# Patient Record
Sex: FEMALE | Race: WHITE | NOT HISPANIC OR LATINO | Employment: OTHER | URBAN - METROPOLITAN AREA
[De-identification: names, ages, dates, MRNs, and addresses within clinical notes are randomized per-mention and may not be internally consistent; named-entity substitution may affect disease eponyms.]

---

## 2017-06-29 LAB — B-HEM STREP SPEC QL CULT: NEGATIVE

## 2017-07-04 LAB
BACTERIA SPEC AEROBE CULT: NORMAL
BACTERIA SPEC CULT: NORMAL
SPECIMEN STATUS REPORT: NORMAL

## 2017-11-29 ENCOUNTER — OFFICE VISIT (OUTPATIENT)
Dept: URGENT CARE | Facility: CLINIC | Age: 82
End: 2017-11-29
Payer: MEDICARE

## 2017-11-29 VITALS
OXYGEN SATURATION: 97 % | SYSTOLIC BLOOD PRESSURE: 149 MMHG | RESPIRATION RATE: 12 BRPM | HEIGHT: 68 IN | HEART RATE: 55 BPM | BODY MASS INDEX: 23.49 KG/M2 | DIASTOLIC BLOOD PRESSURE: 61 MMHG | TEMPERATURE: 97 F | WEIGHT: 155 LBS

## 2017-11-29 DIAGNOSIS — J01.90 ACUTE SINUSITIS, RECURRENCE NOT SPECIFIED, UNSPECIFIED LOCATION: Primary | ICD-10-CM

## 2017-11-29 PROCEDURE — 99214 OFFICE O/P EST MOD 30 MIN: CPT | Mod: S$GLB,,, | Performed by: EMERGENCY MEDICINE

## 2017-11-29 RX ORDER — BENZONATATE 200 MG/1
200 CAPSULE ORAL 3 TIMES DAILY PRN
Qty: 30 CAPSULE | Refills: 0 | Status: SHIPPED | OUTPATIENT
Start: 2017-11-29 | End: 2017-12-09

## 2017-11-29 RX ORDER — DOXYCYCLINE HYCLATE 100 MG
100 TABLET ORAL 2 TIMES DAILY
Qty: 20 TABLET | Refills: 0 | Status: SHIPPED | OUTPATIENT
Start: 2017-11-29 | End: 2017-12-09

## 2017-11-29 RX ORDER — CODEINE PHOSPHATE AND GUAIFENESIN 10; 100 MG/5ML; MG/5ML
10 SOLUTION ORAL EVERY 6 HOURS PRN
Qty: 120 ML | Refills: 0 | Status: SHIPPED | OUTPATIENT
Start: 2017-11-29 | End: 2017-12-09

## 2017-11-29 NOTE — PROGRESS NOTES
"Subjective:       Patient ID: Lakshmi Juarez is a 82 y.o. female.    Vitals:  height is 5' 7.5" (1.715 m) and weight is 70.3 kg (155 lb). Her temperature is 97.4 °F (36.3 °C). Her blood pressure is 149/61 (abnormal) and her pulse is 55 (abnormal). Her respiration is 12 and oxygen saturation is 97%.     Chief Complaint: Sinus Problem    Pt with sinus issue for about 3 weeks. She says the coughing is worse at night. No fever. She does have allergies but this is much worse. Hoarse cough all day long.She can't take decongestants She does take flonase      Sinus Problem   This is a new problem. The current episode started 1 to 4 weeks ago. The problem is unchanged. There has been no fever. Her pain is at a severity of 0/10. She is experiencing no pain. Associated symptoms include congestion and coughing. Pertinent negatives include no chills, ear pain, headaches, hoarse voice, shortness of breath or sore throat. Past treatments include oral decongestants. The treatment provided mild relief.     Review of Systems   Constitution: Negative for chills, fever and malaise/fatigue.   HENT: Positive for congestion. Negative for ear pain, hoarse voice and sore throat.    Eyes: Negative for discharge and redness.   Cardiovascular: Negative for chest pain, dyspnea on exertion and leg swelling.   Respiratory: Positive for cough. Negative for shortness of breath, sputum production and wheezing.    Musculoskeletal: Negative for myalgias.   Gastrointestinal: Negative for abdominal pain and nausea.   Neurological: Negative for headaches.       Objective:      Physical Exam   Constitutional: She is oriented to person, place, and time. She appears well-developed and well-nourished. She is cooperative.  Non-toxic appearance. She does not appear ill. No distress.   Coarse sounding cough   HENT:   Head: Normocephalic and atraumatic.   Right Ear: Hearing, tympanic membrane, external ear and ear canal normal.   Left Ear: Hearing, tympanic " membrane, external ear and ear canal normal.   Nose: Mucosal edema and rhinorrhea present. No nasal deformity. No epistaxis. Right sinus exhibits no maxillary sinus tenderness and no frontal sinus tenderness. Left sinus exhibits no maxillary sinus tenderness and no frontal sinus tenderness.   Mouth/Throat: Uvula is midline and mucous membranes are normal. No trismus in the jaw. Normal dentition. No uvula swelling. Posterior oropharyngeal erythema present.   Eyes: Conjunctivae and lids are normal. No scleral icterus.   Sclera clear bilat   Neck: Trachea normal, full passive range of motion without pain and phonation normal. Neck supple.   Cardiovascular: Normal rate, regular rhythm, normal heart sounds, intact distal pulses and normal pulses.    Pulmonary/Chest: Effort normal and breath sounds normal. No respiratory distress.   Abdominal: Soft. Normal appearance and bowel sounds are normal. She exhibits no distension. There is no tenderness.   Musculoskeletal: Normal range of motion. She exhibits no edema or deformity.   Neurological: She is alert and oriented to person, place, and time. She exhibits normal muscle tone. Coordination normal.   Skin: Skin is warm, dry and intact. She is not diaphoretic. No pallor.   Psychiatric: She has a normal mood and affect. Her speech is normal and behavior is normal. Judgment and thought content normal. Cognition and memory are normal.   Nursing note and vitals reviewed.      Assessment:       1. Acute sinusitis, recurrence not specified, unspecified location        Plan:         Acute sinusitis, recurrence not specified, unspecified location    Other orders  -     benzonatate (TESSALON) 200 MG capsule; Take 1 capsule (200 mg total) by mouth 3 (three) times daily as needed for Cough.  Dispense: 30 capsule; Refill: 0  -     guaifenesin-codeine 100-10 mg/5 ml (TUSSI-ORGANIDIN NR)  mg/5 mL syrup; Take 10 mLs by mouth every 6 (six) hours as needed for Cough.  Dispense: 120 mL;  Refill: 0  -     doxycycline (VIBRA-TABS) 100 MG tablet; Take 1 tablet (100 mg total) by mouth 2 (two) times daily.  Dispense: 20 tablet; Refill: 0

## 2018-02-25 ENCOUNTER — OFFICE VISIT (OUTPATIENT)
Dept: URGENT CARE | Facility: CLINIC | Age: 83
End: 2018-02-25
Payer: MEDICARE

## 2018-02-25 VITALS
HEIGHT: 67 IN | HEART RATE: 61 BPM | DIASTOLIC BLOOD PRESSURE: 69 MMHG | OXYGEN SATURATION: 98 % | SYSTOLIC BLOOD PRESSURE: 130 MMHG | WEIGHT: 155 LBS | RESPIRATION RATE: 19 BRPM | TEMPERATURE: 97 F | BODY MASS INDEX: 24.33 KG/M2

## 2018-02-25 DIAGNOSIS — R05.9 COUGH: Primary | ICD-10-CM

## 2018-02-25 DIAGNOSIS — J06.9 UPPER RESPIRATORY TRACT INFECTION, UNSPECIFIED TYPE: ICD-10-CM

## 2018-02-25 PROCEDURE — 1159F MED LIST DOCD IN RCRD: CPT | Mod: S$GLB,,, | Performed by: EMERGENCY MEDICINE

## 2018-02-25 PROCEDURE — 99214 OFFICE O/P EST MOD 30 MIN: CPT | Mod: S$GLB,,, | Performed by: EMERGENCY MEDICINE

## 2018-02-25 RX ORDER — CODEINE PHOSPHATE AND GUAIFENESIN 10; 100 MG/5ML; MG/5ML
10 SOLUTION ORAL EVERY 6 HOURS PRN
Qty: 120 ML | Refills: 0 | Status: SHIPPED | OUTPATIENT
Start: 2018-02-25 | End: 2018-03-07

## 2018-02-25 RX ORDER — DOXYCYCLINE HYCLATE 100 MG
100 TABLET ORAL 2 TIMES DAILY
Qty: 20 TABLET | Refills: 0 | Status: SHIPPED | OUTPATIENT
Start: 2018-02-25 | End: 2018-03-07

## 2018-02-25 RX ORDER — BENZONATATE 200 MG/1
200 CAPSULE ORAL 3 TIMES DAILY PRN
Qty: 30 CAPSULE | Refills: 0 | Status: SHIPPED | OUTPATIENT
Start: 2018-02-25 | End: 2018-03-07

## 2018-02-25 RX ORDER — PREDNISONE 1 MG/1
1 TABLET ORAL DAILY
Status: ON HOLD | COMMUNITY
End: 2019-01-09 | Stop reason: CLARIF

## 2018-02-25 NOTE — PATIENT INSTRUCTIONS
"Please be aware as we discussed that narcotics can be addictive. I have given you a limited quantity to take as it is needed at this time. However take it sparingly and only when needed.                                                         URI   If your condition worsens or fails to improve we recommend that you receive another evaluation at the ER immediately or contact your PCP to discuss your concerns or return here. You must understand that you've received an urgent care treatment only and that you may be released before all your medical problems are known or treated. [facility] the [facility].   If we discussed that I think your illness is viral, it will not respond to antibiotics and will last 10-14 days. If we discussed "wait and see" antibiotics and if over the next few days the symptoms worsen start the antibiotics I have given you.   If you are female and on BCP and do take the antibiotics, use additional methods to prevent pregnancy while on the antibiotics and for one cycle after.   Flonase (fluticasone) is a nasal spray which is available over the counter and may help with your symptoms.   Zyrtec D, Claritin D or Allegra D can also help with symptoms of congestion and drainage.   If you have hypertension avoid using the "D" which is the decongestant   If you just have drainage you can take plain zyrtec, claritin or allegra   If you just have a congested feeling you can take pseudoephedrine (unless you have high blood pressure) which you have to sign for behind the counter. Do not buy the phenylephrine which is on the shelf as it is not effective   Rest and fluids are also important.   Tylenol or ibuprofen can also be used as directed for pain unless you have an allergy to them or medical condition such as stomach ulcers, kidney or liver disease or blood thinners etc for which you should not be taking these type of medications.   If you are flying in the next few " days Afrin nose drops for the airplane flight upon take off and landing may help. Other than at those times refrain from using afrin.   If you were prescribed a narcotic do not drive or operate heavy machinery while taking these medications.

## 2018-02-25 NOTE — PROGRESS NOTES
"Subjective:       Patient ID: Lakshmi Juarez is a 82 y.o. female.    Vitals:  height is 5' 7" (1.702 m) and weight is 70.3 kg (155 lb). Her oral temperature is 97.4 °F (36.3 °C). Her blood pressure is 130/69 and her pulse is 61. Her respiration is 19 and oxygen saturation is 98%.     Chief Complaint: Cough    Patient with cough x4 days . Patient saw Dr. Barros on February 19, 2018 as followup for epistaxis. Patient was placed on prednisone for chronic allergies and allergic cough and the following day the cough changed to a deep hoarse cough  and has had gotten worse in the last few days. Patient with nose bleed 3 weeks go which required her to be hospitalized. Patient stating she has had a cough which is a clearing of her throat for many years and no doctor has been able to get rid of the symptom.   She was off the eliquis for a few days when she had the nose bleed but is back on it. She did get the flu shot. She has not had fever. She hasn't slept in several days due to the coughing. No chest pain No leg pain    CXR neg discussed wait and see meds. She will call Dr. Barros tomorrow because she thinks the steroids may have caused this. I doubt it but we discussed it. She is finished it anyway      Cough   This is a chronic problem. The problem has been unchanged. The problem occurs every few minutes. Associated symptoms include headaches. Pertinent negatives include no chest pain, chills, ear pain, eye redness, fever, myalgias, sore throat, shortness of breath or wheezing.     Review of Systems   Constitution: Positive for malaise/fatigue. Negative for chills and fever.   HENT: Negative for congestion, ear pain, hoarse voice and sore throat.    Eyes: Negative for discharge and redness.   Cardiovascular: Negative for chest pain, dyspnea on exertion and leg swelling.   Respiratory: Positive for cough and sputum production. Negative for shortness of breath and wheezing.    Musculoskeletal: Negative for myalgias. "   Gastrointestinal: Negative for abdominal pain and nausea.   Neurological: Positive for headaches.       Objective:      Physical Exam   Constitutional: She is oriented to person, place, and time. She appears well-developed and well-nourished. She is cooperative.  Non-toxic appearance. She does not appear ill. No distress.   Deep hoarse sounding cough   HENT:   Head: Normocephalic and atraumatic.   Right Ear: Hearing, tympanic membrane, external ear and ear canal normal.   Left Ear: Hearing, tympanic membrane, external ear and ear canal normal.   Nose: Nose normal. No mucosal edema, rhinorrhea or nasal deformity. No epistaxis. Right sinus exhibits no maxillary sinus tenderness and no frontal sinus tenderness. Left sinus exhibits no maxillary sinus tenderness and no frontal sinus tenderness.   Mouth/Throat: Uvula is midline, oropharynx is clear and moist and mucous membranes are normal. No trismus in the jaw. Normal dentition. No uvula swelling. No posterior oropharyngeal erythema.   Eyes: Conjunctivae, EOM and lids are normal. Pupils are equal, round, and reactive to light. No scleral icterus.   Sclera clear bilat   Neck: Trachea normal, normal range of motion, full passive range of motion without pain and phonation normal. Neck supple.   Cardiovascular: Normal rate, regular rhythm, normal heart sounds, intact distal pulses and normal pulses.    Pulmonary/Chest: Effort normal and breath sounds normal. No respiratory distress.   Abdominal: Soft. Normal appearance and bowel sounds are normal. She exhibits no distension. There is no tenderness.   Musculoskeletal: Normal range of motion. She exhibits no edema or deformity.   Neurological: She is alert and oriented to person, place, and time. She exhibits normal muscle tone. Coordination normal.   Skin: Skin is warm, dry and intact. She is not diaphoretic. No pallor.   Psychiatric: She has a normal mood and affect. Her speech is normal and behavior is normal. Judgment  and thought content normal. Cognition and memory are normal.   Nursing note and vitals reviewed.    CXR neg and confirm radiologist  Assessment:       1. Cough    2. Upper respiratory tract infection, unspecified type        Plan:         Cough  -     X-Ray Chest PA And Lateral; Future; Expected date: 02/25/2018    Upper respiratory tract infection, unspecified type    Other orders  -     benzonatate (TESSALON) 200 MG capsule; Take 1 capsule (200 mg total) by mouth 3 (three) times daily as needed for Cough.  Dispense: 30 capsule; Refill: 0  -     guaifenesin-codeine 100-10 mg/5 ml (TUSSI-ORGANIDIN NR)  mg/5 mL syrup; Take 10 mLs by mouth every 6 (six) hours as needed for Cough.  Dispense: 120 mL; Refill: 0  -     doxycycline (VIBRA-TABS) 100 MG tablet; Take 1 tablet (100 mg total) by mouth 2 (two) times daily.  Dispense: 20 tablet; Refill: 0

## 2019-01-03 ENCOUNTER — OFFICE VISIT (OUTPATIENT)
Dept: URGENT CARE | Facility: CLINIC | Age: 84
End: 2019-01-03
Payer: MEDICARE

## 2019-01-03 ENCOUNTER — HOSPITAL ENCOUNTER (INPATIENT)
Facility: OTHER | Age: 84
LOS: 8 days | Discharge: HOME | DRG: 392 | End: 2019-01-11
Attending: EMERGENCY MEDICINE | Admitting: HOSPITALIST
Payer: MEDICARE

## 2019-01-03 VITALS
TEMPERATURE: 97 F | OXYGEN SATURATION: 98 % | HEART RATE: 107 BPM | WEIGHT: 150 LBS | RESPIRATION RATE: 18 BRPM | DIASTOLIC BLOOD PRESSURE: 70 MMHG | SYSTOLIC BLOOD PRESSURE: 116 MMHG | HEIGHT: 67 IN | BODY MASS INDEX: 23.54 KG/M2

## 2019-01-03 DIAGNOSIS — K52.9 COLITIS: ICD-10-CM

## 2019-01-03 DIAGNOSIS — I48.92 ATRIAL FLUTTER: ICD-10-CM

## 2019-01-03 DIAGNOSIS — R10.9 ABDOMINAL PAIN, UNSPECIFIED ABDOMINAL LOCATION: Primary | ICD-10-CM

## 2019-01-03 DIAGNOSIS — K56.609 INTESTINAL OBSTRUCTION, UNSPECIFIED CAUSE, UNSPECIFIED WHETHER PARTIAL OR COMPLETE: ICD-10-CM

## 2019-01-03 DIAGNOSIS — K52.9 ACUTE COLITIS: Primary | ICD-10-CM

## 2019-01-03 LAB
ALBUMIN SERPL BCP-MCNC: 4 G/DL
ALP SERPL-CCNC: 99 U/L
ALT SERPL W/O P-5'-P-CCNC: 20 U/L
AMYLASE SERPL-CCNC: 29 U/L
ANION GAP SERPL CALC-SCNC: 10 MMOL/L
AST SERPL-CCNC: 21 U/L
BACTERIA #/AREA URNS HPF: ABNORMAL /HPF
BASOPHILS # BLD AUTO: 0.03 K/UL
BASOPHILS NFR BLD: 0.2 %
BILIRUB SERPL-MCNC: 2.9 MG/DL
BILIRUB UR QL STRIP: NEGATIVE
BUN SERPL-MCNC: 19 MG/DL
CALCIUM SERPL-MCNC: 9.6 MG/DL
CHLORIDE SERPL-SCNC: 104 MMOL/L
CLARITY UR: CLEAR
CO2 SERPL-SCNC: 24 MMOL/L
COLOR UR: YELLOW
CREAT SERPL-MCNC: 0.9 MG/DL
DIFFERENTIAL METHOD: ABNORMAL
EOSINOPHIL # BLD AUTO: 0 K/UL
EOSINOPHIL NFR BLD: 0.1 %
ERYTHROCYTE [DISTWIDTH] IN BLOOD BY AUTOMATED COUNT: 14.4 %
EST. GFR  (AFRICAN AMERICAN): >60 ML/MIN/1.73 M^2
EST. GFR  (NON AFRICAN AMERICAN): >60 ML/MIN/1.73 M^2
GLUCOSE SERPL-MCNC: 111 MG/DL
GLUCOSE UR QL STRIP: NEGATIVE
HCT VFR BLD AUTO: 42.8 %
HGB BLD-MCNC: 14.2 G/DL
HGB UR QL STRIP: ABNORMAL
INR PPP: 1
KETONES UR QL STRIP: NEGATIVE
LACTATE SERPL-SCNC: 1.8 MMOL/L
LEUKOCYTE ESTERASE UR QL STRIP: NEGATIVE
LIPASE SERPL-CCNC: 8 U/L
LYMPHOCYTES # BLD AUTO: 1.2 K/UL
LYMPHOCYTES NFR BLD: 6.2 %
MCH RBC QN AUTO: 30.4 PG
MCHC RBC AUTO-ENTMCNC: 33.2 G/DL
MCV RBC AUTO: 92 FL
MICROSCOPIC COMMENT: ABNORMAL
MONOCYTES # BLD AUTO: 1.3 K/UL
MONOCYTES NFR BLD: 6.8 %
NEUTROPHILS # BLD AUTO: 16.2 K/UL
NEUTROPHILS NFR BLD: 86.4 %
NITRITE UR QL STRIP: NEGATIVE
PH UR STRIP: 7 [PH] (ref 5–8)
PLATELET # BLD AUTO: 256 K/UL
PMV BLD AUTO: 10.9 FL
POTASSIUM SERPL-SCNC: 3.8 MMOL/L
PROT SERPL-MCNC: 7.1 G/DL
PROT UR QL STRIP: NEGATIVE
PROTHROMBIN TIME: 10.8 SEC
RBC # BLD AUTO: 4.67 M/UL
RBC #/AREA URNS HPF: 1 /HPF (ref 0–4)
SODIUM SERPL-SCNC: 138 MMOL/L
SP GR UR STRIP: <=1.005 (ref 1–1.03)
URN SPEC COLLECT METH UR: ABNORMAL
UROBILINOGEN UR STRIP-ACNC: NEGATIVE EU/DL
WBC # BLD AUTO: 18.69 K/UL
WBC #/AREA URNS HPF: 1 /HPF (ref 0–5)

## 2019-01-03 PROCEDURE — 80053 COMPREHEN METABOLIC PANEL: CPT

## 2019-01-03 PROCEDURE — 85025 COMPLETE CBC W/AUTO DIFF WBC: CPT

## 2019-01-03 PROCEDURE — 99214 PR OFFICE/OUTPT VISIT, EST, LEVL IV, 30-39 MIN: ICD-10-PCS | Mod: S$GLB,,, | Performed by: FAMILY MEDICINE

## 2019-01-03 PROCEDURE — 83690 ASSAY OF LIPASE: CPT

## 2019-01-03 PROCEDURE — 96365 THER/PROPH/DIAG IV INF INIT: CPT

## 2019-01-03 PROCEDURE — 87040 BLOOD CULTURE FOR BACTERIA: CPT

## 2019-01-03 PROCEDURE — 25000003 PHARM REV CODE 250: Performed by: NURSE PRACTITIONER

## 2019-01-03 PROCEDURE — 99223 PR INITIAL HOSPITAL CARE,LEVL III: ICD-10-PCS | Mod: AI,,, | Performed by: NURSE PRACTITIONER

## 2019-01-03 PROCEDURE — 99223 1ST HOSP IP/OBS HIGH 75: CPT | Mod: AI,,, | Performed by: NURSE PRACTITIONER

## 2019-01-03 PROCEDURE — 99214 OFFICE O/P EST MOD 30 MIN: CPT | Mod: S$GLB,,, | Performed by: FAMILY MEDICINE

## 2019-01-03 PROCEDURE — 96375 TX/PRO/DX INJ NEW DRUG ADDON: CPT

## 2019-01-03 PROCEDURE — 74019 RADEX ABDOMEN 2 VIEWS: CPT | Mod: FY,S$GLB,, | Performed by: RADIOLOGY

## 2019-01-03 PROCEDURE — 25000003 PHARM REV CODE 250: Performed by: EMERGENCY MEDICINE

## 2019-01-03 PROCEDURE — 85610 PROTHROMBIN TIME: CPT

## 2019-01-03 PROCEDURE — 63600175 PHARM REV CODE 636 W HCPCS: Performed by: EMERGENCY MEDICINE

## 2019-01-03 PROCEDURE — 99285 EMERGENCY DEPT VISIT HI MDM: CPT | Mod: 25

## 2019-01-03 PROCEDURE — 81000 URINALYSIS NONAUTO W/SCOPE: CPT

## 2019-01-03 PROCEDURE — 11000001 HC ACUTE MED/SURG PRIVATE ROOM

## 2019-01-03 PROCEDURE — 96372 THER/PROPH/DIAG INJ SC/IM: CPT | Mod: 59

## 2019-01-03 PROCEDURE — S0030 INJECTION, METRONIDAZOLE: HCPCS | Performed by: EMERGENCY MEDICINE

## 2019-01-03 PROCEDURE — 74019 XR ABDOMEN FLAT AND ERECT: ICD-10-PCS | Mod: FY,S$GLB,, | Performed by: RADIOLOGY

## 2019-01-03 PROCEDURE — 96361 HYDRATE IV INFUSION ADD-ON: CPT

## 2019-01-03 PROCEDURE — 83605 ASSAY OF LACTIC ACID: CPT

## 2019-01-03 PROCEDURE — 63600175 PHARM REV CODE 636 W HCPCS: Performed by: NURSE PRACTITIONER

## 2019-01-03 PROCEDURE — 25500020 PHARM REV CODE 255: Performed by: EMERGENCY MEDICINE

## 2019-01-03 PROCEDURE — 82150 ASSAY OF AMYLASE: CPT

## 2019-01-03 RX ORDER — OXYBUTYNIN CHLORIDE 5 MG/1
10 TABLET, EXTENDED RELEASE ORAL DAILY
Status: DISCONTINUED | OUTPATIENT
Start: 2019-01-04 | End: 2019-01-11 | Stop reason: HOSPADM

## 2019-01-03 RX ORDER — LOSARTAN POTASSIUM 50 MG/1
100 TABLET ORAL DAILY
Status: DISCONTINUED | OUTPATIENT
Start: 2019-01-04 | End: 2019-01-11 | Stop reason: HOSPADM

## 2019-01-03 RX ORDER — FAMOTIDINE 20 MG/1
20 TABLET, FILM COATED ORAL 2 TIMES DAILY
Status: DISCONTINUED | OUTPATIENT
Start: 2019-01-03 | End: 2019-01-11 | Stop reason: HOSPADM

## 2019-01-03 RX ORDER — CIPROFLOXACIN 2 MG/ML
400 INJECTION, SOLUTION INTRAVENOUS
Status: COMPLETED | OUTPATIENT
Start: 2019-01-03 | End: 2019-01-03

## 2019-01-03 RX ORDER — MORPHINE SULFATE 4 MG/ML
4 INJECTION, SOLUTION INTRAMUSCULAR; INTRAVENOUS EVERY 4 HOURS PRN
Status: DISCONTINUED | OUTPATIENT
Start: 2019-01-03 | End: 2019-01-05

## 2019-01-03 RX ORDER — CIPROFLOXACIN 2 MG/ML
400 INJECTION, SOLUTION INTRAVENOUS
Status: DISCONTINUED | OUTPATIENT
Start: 2019-01-04 | End: 2019-01-10

## 2019-01-03 RX ORDER — ACETAMINOPHEN 325 MG/1
650 TABLET ORAL EVERY 4 HOURS PRN
Status: DISCONTINUED | OUTPATIENT
Start: 2019-01-03 | End: 2019-01-11 | Stop reason: HOSPADM

## 2019-01-03 RX ORDER — METRONIDAZOLE 500 MG/100ML
500 INJECTION, SOLUTION INTRAVENOUS
Status: DISCONTINUED | OUTPATIENT
Start: 2019-01-04 | End: 2019-01-10

## 2019-01-03 RX ORDER — ONDANSETRON 2 MG/ML
4 INJECTION INTRAMUSCULAR; INTRAVENOUS
Status: COMPLETED | OUTPATIENT
Start: 2019-01-03 | End: 2019-01-03

## 2019-01-03 RX ORDER — ONDANSETRON 8 MG/1
8 TABLET, ORALLY DISINTEGRATING ORAL EVERY 8 HOURS PRN
Status: DISCONTINUED | OUTPATIENT
Start: 2019-01-03 | End: 2019-01-11 | Stop reason: HOSPADM

## 2019-01-03 RX ORDER — PANTOPRAZOLE SODIUM 40 MG/1
40 TABLET, DELAYED RELEASE ORAL DAILY
Status: DISCONTINUED | OUTPATIENT
Start: 2019-01-04 | End: 2019-01-11 | Stop reason: HOSPADM

## 2019-01-03 RX ORDER — SODIUM CHLORIDE 0.9 % (FLUSH) 0.9 %
5 SYRINGE (ML) INJECTION
Status: DISCONTINUED | OUTPATIENT
Start: 2019-01-03 | End: 2019-01-03

## 2019-01-03 RX ORDER — SODIUM CHLORIDE 0.9 % (FLUSH) 0.9 %
5 SYRINGE (ML) INJECTION
Status: DISCONTINUED | OUTPATIENT
Start: 2019-01-03 | End: 2019-01-11 | Stop reason: HOSPADM

## 2019-01-03 RX ORDER — SODIUM CHLORIDE 9 MG/ML
INJECTION, SOLUTION INTRAVENOUS CONTINUOUS
Status: DISCONTINUED | OUTPATIENT
Start: 2019-01-03 | End: 2019-01-05

## 2019-01-03 RX ORDER — METRONIDAZOLE 500 MG/100ML
500 INJECTION, SOLUTION INTRAVENOUS
Status: COMPLETED | OUTPATIENT
Start: 2019-01-03 | End: 2019-01-03

## 2019-01-03 RX ORDER — NIFEDIPINE 30 MG/1
30 TABLET, EXTENDED RELEASE ORAL DAILY
Status: DISCONTINUED | OUTPATIENT
Start: 2019-01-04 | End: 2019-01-11 | Stop reason: HOSPADM

## 2019-01-03 RX ORDER — HYDROMORPHONE HYDROCHLORIDE 1 MG/ML
1 INJECTION, SOLUTION INTRAMUSCULAR; INTRAVENOUS; SUBCUTANEOUS
Status: COMPLETED | OUTPATIENT
Start: 2019-01-03 | End: 2019-01-03

## 2019-01-03 RX ORDER — PREDNISONE 1 MG/1
1 TABLET ORAL DAILY
Status: DISCONTINUED | OUTPATIENT
Start: 2019-01-04 | End: 2019-01-11 | Stop reason: HOSPADM

## 2019-01-03 RX ADMIN — ONDANSETRON 4 MG: 2 INJECTION INTRAMUSCULAR; INTRAVENOUS at 06:01

## 2019-01-03 RX ADMIN — HYDROMORPHONE HYDROCHLORIDE 1 MG: 1 INJECTION, SOLUTION INTRAMUSCULAR; INTRAVENOUS; SUBCUTANEOUS at 06:01

## 2019-01-03 RX ADMIN — METRONIDAZOLE 500 MG: 500 INJECTION, SOLUTION INTRAVENOUS at 09:01

## 2019-01-03 RX ADMIN — SODIUM CHLORIDE 1000 ML: 0.9 INJECTION, SOLUTION INTRAVENOUS at 06:01

## 2019-01-03 RX ADMIN — SODIUM CHLORIDE: 0.9 INJECTION, SOLUTION INTRAVENOUS at 10:01

## 2019-01-03 RX ADMIN — APIXABAN 5 MG: 2.5 TABLET, FILM COATED ORAL at 10:01

## 2019-01-03 RX ADMIN — FAMOTIDINE 20 MG: 20 TABLET ORAL at 10:01

## 2019-01-03 RX ADMIN — CIPROFLOXACIN 400 MG: 2 INJECTION, SOLUTION INTRAVENOUS at 08:01

## 2019-01-03 RX ADMIN — MORPHINE SULFATE 4 MG: 4 INJECTION INTRAVENOUS at 10:01

## 2019-01-03 RX ADMIN — IOHEXOL 75 ML: 350 INJECTION, SOLUTION INTRAVENOUS at 06:01

## 2019-01-03 NOTE — ED PROVIDER NOTES
Encounter Date: 1/3/2019    SCRIBE #1 NOTE: Elysia DUNAWAY am scribing for, and in the presence of, Dr. Ng.       History     Chief Complaint   Patient presents with    GI Problem     sent from PCP for bowel obstruction confirmed by x-ray. reports loose BM today with nausea and severe pain     Time seen by provider: 4:46 PM    This is a 83 y.o. female with hx of HTN and colon CA who presents with complaint of abdominal pain since yesterday evening. She states it started to the back, but moved to the mid abdomen. There is associated nausea, diarrhea, and abdominal distension. She had two episodes of diarrhea. Last bowel movement was about 4 hours ago. She also reports lack of appetite. Pt is able to pass gas. Pt was seen at urgent care and abdominal x-ray at that time revealed probably bowel obstruction. Pt has PShx of total abdominal hysterectomy with bilateral salpingoophorectomy, partial colon resection, and cardiac valve replacement. She may have had an appendectomy, but is unsure. She denies any fever, chills, vomiting, chest pain, SOB, lightheadedness, and weakness. No urinary sx.      The history is provided by the patient.     Review of patient's allergies indicates:   Allergen Reactions    Cashew nut      Abdominal pain    Unable to assess      Freon- facial swelling     Past Medical History:   Diagnosis Date    Cancer     GERD (gastroesophageal reflux disease)     Hypertension      Past Surgical History:   Procedure Laterality Date    CARDIAC VALVE REPLACEMENT      INSERTION-INTRAOCULAR LENS (IOL) Left 7/26/2016    [facility]    PHACOEMULSIFICATION-ASPIRATION-CATARACT Left 7/26/2016    [facility]    TOTAL ABDOMINAL HYSTERECTOMY W/ BILATERAL SALPINGOOPHORECTOMY       Family History   Problem Relation Age of Onset    Parkinsonism Sister     Cancer Brother      Social History     Tobacco Use    Smoking status: Former Smoker     Smokeless tobacco: Never Used   Substance Use Topics    Alcohol use: Yes    Drug use: No     Review of Systems   Constitutional: Positive for appetite change. Negative for chills and fever.   HENT: Negative for congestion, rhinorrhea and sore throat.    Respiratory: Negative for cough and shortness of breath.    Cardiovascular: Negative for chest pain.   Gastrointestinal: Positive for abdominal distention, abdominal pain, diarrhea and nausea. Negative for vomiting.   Endocrine: Negative for polyuria.   Genitourinary: Negative for decreased urine volume, difficulty urinating, dysuria and frequency.   Musculoskeletal: Negative for back pain.   Skin: Negative for rash.   Allergic/Immunologic: Negative for immunocompromised state.   Neurological: Negative for dizziness, weakness, light-headedness and headaches.   Hematological: Does not bruise/bleed easily.   Psychiatric/Behavioral: Negative for confusion.       Physical Exam     Initial Vitals [01/03/19 1424]   BP Pulse Resp Temp SpO2   118/62 (!) 111 18 97.3 °F (36.3 °C) 100 %      MAP       --         Physical Exam    Nursing note and vitals reviewed.  Constitutional: She appears well-developed and well-nourished. No distress.   HENT:   Head: Normocephalic and atraumatic.   Right Ear: External ear normal.   Left Ear: External ear normal.   Mouth/Throat: Mucous membranes are dry.   Eyes: Right eye exhibits no discharge. Left eye exhibits no discharge.   Neck: Normal range of motion. Neck supple.   Cardiovascular: Normal rate, regular rhythm, S1 normal and S2 normal.   Murmur heard.   Systolic murmur is present.  Pulmonary/Chest: Breath sounds normal. No respiratory distress. She has no wheezes. She has no rhonchi. She has no rales.   Abdominal: Soft. There is tenderness. There is no rebound, no guarding and no CVA tenderness.   Moderate tenderness to the mid to R epigastric abdomen. Decreased bowel sounds.   Musculoskeletal: Normal range of motion.    Lymphadenopathy:     She has no cervical adenopathy.   Neurological: She is alert and oriented to person, place, and time. She has normal strength. No cranial nerve deficit or sensory deficit.   Skin: Skin is warm and dry. No rash noted. No erythema.   Psychiatric: She has a normal mood and affect. Her behavior is normal.         ED Course   Procedures  Labs Reviewed   CBC W/ AUTO DIFFERENTIAL - Abnormal; Notable for the following components:       Result Value    WBC 18.69 (*)     Gran # (ANC) 16.2 (*)     Mono # 1.3 (*)     Gran% 86.4 (*)     Lymph% 6.2 (*)     All other components within normal limits   COMPREHENSIVE METABOLIC PANEL - Abnormal; Notable for the following components:    Glucose 111 (*)     Total Bilirubin 2.9 (*)     All other components within normal limits   CULTURE, BLOOD   CULTURE, BLOOD   AMYLASE   LIPASE   PROTIME-INR   LACTIC ACID, PLASMA   URINALYSIS          Imaging Results          CT Abdomen Pelvis With Contrast (Final result)  Result time 01/03/19 18:16:05    Final result by Hever Ngo MD (01/03/19 18:16:05)                 Impression:      Marked bowel wall thickening of the cecum with associated fat stranding.  Appendix is not definitively visualized.  Findings may represent colitis although an underlying mass is not excluded.    Cholelithiasis.      Electronically signed by: Hever Ngo MD  Date:    01/03/2019  Time:    18:16             Narrative:    EXAMINATION:  CT ABDOMEN PELVIS WITH CONTRAST    CLINICAL HISTORY:  Bowel obstruction;    TECHNIQUE:  Low dose axial images, sagittal and coronal reformations were obtained from the lung bases to the pubic symphysis following the IV administration of 75 mL of Omnipaque 350 and the oral administration of 30 mL of Omnipaque 350.    COMPARISON:  None.    FINDINGS:  Abdomen:    - Lower thorax:Mild cardiomegaly.  No pericardial effusion.    - Lung bases: No infiltrates and no nodules.    - Liver: No focal mass.    -  Gallbladder: 3 calcified gallstones.  No adjacent inflammatory changes.    - Bile Ducts: No evidence of intra or extra hepatic biliary ductal dilation.    - Spleen: Negative.    - Kidneys: No mass or hydronephrosis.    - Adrenals: Unremarkable.    - Pancreas: No mass or peripancreatic fat stranding.    - Retroperitoneum:  No significant adenopathy.    - Vascular: No abdominal aortic aneurysm.  Mild calcific atherosclerosis.    - Abdominal wall:  Unremarkable.    Pelvis:    No pelvic mass, adenopathy, or free fluid.    Bowel/Mesentery:    Small bowel is normal in caliber.  No evidence of small-bowel obstruction.  Marked bowel wall thickening of the cecum with associated mild adjacent fat stranding.  Appendix is not definitively visualized.  No evidence of enlarged lymph nodes.  Remaining portion of the colon is normal in appearance.    Bones:  No acute osseous abnormality and no suspicious lytic or blastic lesion.                                 Medical Decision Making:   Clinical Tests:   Lab Tests: Ordered and Reviewed  Radiological Study: Ordered  ED Management:  6:50 PM - Updated pt on results and explained plan of care.  6:56 PM - Discussed pt with Patrick Ludwig NP of hospitalist service. Will admit pt to Dr. Wild.    Additional MDM:   Comments: 83-year-old female with episode crampy abdominal pain and 2 episodes of diarrhea this morning.  Sent from urgent care for possible small-bowel obstruction.  Patient here with abdominal cramping and decreased bowel sounds CT consistent with colitis no signs of obstruction.  White cell count elevated.  Patient was given fluids she got blood cultures and antibiotics.  Case discussed with Patrick.  Patient will need antibiotics pain medicine fluids and GI consult.  Also likely colonoscopy.  Differential diagnosis bowel obstruction volvulus pancreatitis for ruptured appendicitis, appendicitis, colitis, intussusception, mesenteric ischemia, hollow viscus perforation,  intra-abdominal abscess, amongst other diagnosis.    Of note not listed in her past medical history patient says that she did have a colon cancer that was treated with resection only.  She said that she was told she did not require radiation or chemotherapy..          Scribe Attestation:   Scribe #1: I performed the above scribed service and the documentation accurately describes the services I performed. I attest to the accuracy of the note.    Attending Attestation:           Physician Attestation for Scribe:  Physician Attestation Statement for Scribe #1: I, Dr. Ng, reviewed documentation, as scribed by Elysia Presley in my presence, and it is both accurate and complete.                    Clinical Impression:     1. Colitis                                 Eliza Ng MD  01/03/19 2022       Eliza Ng MD  01/03/19 2052

## 2019-01-03 NOTE — PATIENT INSTRUCTIONS
GO STRAIGHT TO THE ER AND DO NOT EAT OR DRINK ANYTHING UNLESS A HEALTHCARE PROVIDER GIVES IT TO YOU.     THE [facility] Emergency Department in [address] [facility]    Phone:  [phone]    Fax:  [address]   MRN: [id number]    Department:  THE [facility] Emergency Department in [place]   Date of Visit: IF THERE IS ANY CHANGE OR WORSENING OF YOUR CONDITION, CALL YOUR PRIMARY CARE PHYSICIAN AT ONCE OR RETURN IMMEDIATELY TO THE EMERGENCY DEPARTMENT.     If you have been prescribed any medication(s), please fill your prescription right away and begin taking t

## 2019-01-03 NOTE — ED NOTES
Pt rounding, pt states that she is very uncomfortable, and that she is experiencing a pain of 10.

## 2019-01-03 NOTE — PROGRESS NOTES
"Subjective:       Patient ID: Lakshmi Juarez is a 83 y.o. female.    Vitals:  height is 5' 7" (1.702 m) and weight is 68 kg (150 lb). Her oral temperature is 97 °F (36.1 °C). Her blood pressure is 116/70 and her pulse is 107. Her respiration is 18 and oxygen saturation is 98%.     Chief Complaint: Abdominal Pain    Pt states she is experiencing lower back pain that moved to her stomach starting last night. Pt states her stomach is painful to touch - not as severe as last night.  Pt states she no longer has any back pain. Pt reports nausea. Pt denies vomiting/diarrhea. Pt denies urinary symptoms. Pt states she is taking tylenol over the counter. Her stool has been loose, without blood or dark color.       Abdominal Pain   This is a new problem. The current episode started yesterday. The onset quality is sudden. The problem occurs constantly. The problem has been waxing and waning. The pain is located in the suprapubic region and generalized abdominal region. The pain is at a severity of 9/10. The pain is severe. The quality of the pain is cramping. Associated symptoms include headaches and nausea. Pertinent negatives include no constipation, diarrhea, dysuria, fever or vomiting. Nothing aggravates the pain. The pain is relieved by nothing. There is no history of abdominal surgery.       Constitution: Negative for appetite change, chills, sweating and fever.   HENT: Negative for trouble swallowing.    Cardiovascular: Negative for chest pain.   Respiratory: Negative for shortness of breath.    Gastrointestinal: Positive for abdominal pain and nausea. Negative for abdominal trauma, abdominal bloating, history of abdominal surgery, vomiting, constipation, diarrhea, dark colored stools and heartburn.   Genitourinary: Negative for dysuria, missed menses and pelvic pain.   Musculoskeletal: Negative for back pain.   Neurological: Positive for headaches.       Objective:      Physical Exam   Constitutional: She is oriented " to person, place, and time. She appears well-developed and well-nourished.   HENT:   Head: Normocephalic and atraumatic.   Right Ear: External ear normal.   Left Ear: External ear normal.   Nose: Nose normal.   Mouth/Throat: Mucous membranes are normal.   Eyes: Conjunctivae and lids are normal.   Neck: Trachea normal and full passive range of motion without pain. Neck supple.   Cardiovascular: Normal rate, regular rhythm and normal heart sounds.   Pulmonary/Chest: Effort normal and breath sounds normal. No respiratory distress.   Abdominal: Soft. Normal appearance and bowel sounds are normal. She exhibits abdominal bruit. She exhibits no distension, no pulsatile midline mass and no mass. There is no hepatosplenomegaly. There is tenderness in the right upper quadrant, right lower quadrant and suprapubic area. There is no rigidity, no rebound, no guarding, no CVA tenderness, no tenderness at McBurney's point and negative Guerrero's sign.   Musculoskeletal: Normal range of motion. She exhibits no edema.   Neurological: She is alert and oriented to person, place, and time. She has normal strength.   Skin: Skin is warm, dry and intact. She is not diaphoretic. No pallor.   Psychiatric: She has a normal mood and affect. Her speech is normal and behavior is normal. Judgment and thought content normal. Cognition and memory are normal.   Nursing note and vitals reviewed.    X-ray Abdomen Flat And Erect    Result Date: 1/3/2019  EXAMINATION: XR ABDOMEN FLAT AND ERECT CLINICAL HISTORY: Unspecified abdominal pain TECHNIQUE: 3 flat and erect AP views of the abdomen were performed. COMPARISON: None FINDINGS: Borderline dilated loops of small bowel with scattered air-fluid levels in the mid right lateral lower abdomen and pelvis.  Air and stool are noted throughout the colon. No evidence for organomegaly, abnormal calcification or pneumoperitoneum. Multilevel degenerative changes of the imaged spine.     1.  Borderline dilated loops  of small bowel with scattered air-fluid levels and air/stool throughout the colon suggesting a low grade partial versus early complete bowel obstruction.  Consider CT abdomen/pelvis for more accurate assessment if clinically indicated. Electronically signed by: Ray Canela Date:    01/03/2019 Time:    13:50    Assessment:       1. Abdominal pain, unspecified abdominal location    2. Intestinal obstruction, unspecified cause, unspecified whether partial or complete        Plan:         Abdominal pain, unspecified abdominal location  -     X-Ray Abdomen Flat And Erect; Future; Expected date: 01/03/2019  -     Refer to Emergency Dept.    Intestinal obstruction, unspecified cause, unspecified whether partial or complete  -     Refer to Emergency Dept.      Patient Instructions   GO STRAIGHT TO THE ER AND DO NOT EAT OR DRINK ANYTHING UNLESS A HEALTHCARE PROVIDER GIVES IT TO YOU.    ddx including but not limitied to: SBO, AAA, appendicitis, cholecystitis, nephrolithiasis, gastroenteritis, constipation  offered ambulance transport, pt would like for  to drive her. Okay to go POV. Pt in agreement with plan  Called report to [facility] ER  Contacted dr babatunde rod to send to ER

## 2019-01-04 LAB
ALBUMIN SERPL BCP-MCNC: 2.9 G/DL
ALBUMIN SERPL BCP-MCNC: 2.9 G/DL
ALP SERPL-CCNC: 86 U/L
ALP SERPL-CCNC: 86 U/L
ALT SERPL W/O P-5'-P-CCNC: 15 U/L
ALT SERPL W/O P-5'-P-CCNC: 15 U/L
ANION GAP SERPL CALC-SCNC: 6 MMOL/L
ANION GAP SERPL CALC-SCNC: 6 MMOL/L
AST SERPL-CCNC: 16 U/L
AST SERPL-CCNC: 16 U/L
BASOPHILS # BLD AUTO: 0.03 K/UL
BASOPHILS # BLD AUTO: 0.03 K/UL
BASOPHILS NFR BLD: 0.2 %
BASOPHILS NFR BLD: 0.2 %
BILIRUB SERPL-MCNC: 1.7 MG/DL
BILIRUB SERPL-MCNC: 1.7 MG/DL
BUN SERPL-MCNC: 14 MG/DL
BUN SERPL-MCNC: 14 MG/DL
CALCIUM SERPL-MCNC: 8.4 MG/DL
CALCIUM SERPL-MCNC: 8.4 MG/DL
CHLORIDE SERPL-SCNC: 109 MMOL/L
CHLORIDE SERPL-SCNC: 109 MMOL/L
CHOLEST SERPL-MCNC: 133 MG/DL
CHOLEST/HDLC SERPL: 3 {RATIO}
CO2 SERPL-SCNC: 23 MMOL/L
CO2 SERPL-SCNC: 23 MMOL/L
CREAT SERPL-MCNC: 0.9 MG/DL
CREAT SERPL-MCNC: 0.9 MG/DL
DIFFERENTIAL METHOD: ABNORMAL
DIFFERENTIAL METHOD: ABNORMAL
EOSINOPHIL # BLD AUTO: 0 K/UL
EOSINOPHIL # BLD AUTO: 0 K/UL
EOSINOPHIL NFR BLD: 0.2 %
EOSINOPHIL NFR BLD: 0.2 %
ERYTHROCYTE [DISTWIDTH] IN BLOOD BY AUTOMATED COUNT: 14.7 %
ERYTHROCYTE [DISTWIDTH] IN BLOOD BY AUTOMATED COUNT: 14.7 %
EST. GFR  (AFRICAN AMERICAN): >60 ML/MIN/1.73 M^2
EST. GFR  (AFRICAN AMERICAN): >60 ML/MIN/1.73 M^2
EST. GFR  (NON AFRICAN AMERICAN): >60 ML/MIN/1.73 M^2
EST. GFR  (NON AFRICAN AMERICAN): >60 ML/MIN/1.73 M^2
GLUCOSE SERPL-MCNC: 94 MG/DL
GLUCOSE SERPL-MCNC: 94 MG/DL
HCT VFR BLD AUTO: 37.2 %
HCT VFR BLD AUTO: 37.2 %
HDLC SERPL-MCNC: 44 MG/DL
HDLC SERPL: 33.1 %
HGB BLD-MCNC: 12.1 G/DL
HGB BLD-MCNC: 12.1 G/DL
LDLC SERPL CALC-MCNC: 70.8 MG/DL
LYMPHOCYTES # BLD AUTO: 1.6 K/UL
LYMPHOCYTES # BLD AUTO: 1.6 K/UL
LYMPHOCYTES NFR BLD: 10.7 %
LYMPHOCYTES NFR BLD: 10.7 %
MAGNESIUM SERPL-MCNC: 2 MG/DL
MCH RBC QN AUTO: 30.3 PG
MCH RBC QN AUTO: 30.3 PG
MCHC RBC AUTO-ENTMCNC: 32.5 G/DL
MCHC RBC AUTO-ENTMCNC: 32.5 G/DL
MCV RBC AUTO: 93 FL
MCV RBC AUTO: 93 FL
MONOCYTES # BLD AUTO: 1.2 K/UL
MONOCYTES # BLD AUTO: 1.2 K/UL
MONOCYTES NFR BLD: 7.6 %
MONOCYTES NFR BLD: 7.6 %
NEUTROPHILS # BLD AUTO: 12.3 K/UL
NEUTROPHILS # BLD AUTO: 12.3 K/UL
NEUTROPHILS NFR BLD: 81 %
NEUTROPHILS NFR BLD: 81 %
NONHDLC SERPL-MCNC: 89 MG/DL
PHOSPHATE SERPL-MCNC: 3.3 MG/DL
PLATELET # BLD AUTO: 222 K/UL
PLATELET # BLD AUTO: 222 K/UL
PMV BLD AUTO: 10.8 FL
PMV BLD AUTO: 10.8 FL
POTASSIUM SERPL-SCNC: 3.7 MMOL/L
POTASSIUM SERPL-SCNC: 3.7 MMOL/L
PROT SERPL-MCNC: 5.5 G/DL
PROT SERPL-MCNC: 5.5 G/DL
RBC # BLD AUTO: 3.99 M/UL
RBC # BLD AUTO: 3.99 M/UL
SODIUM SERPL-SCNC: 138 MMOL/L
SODIUM SERPL-SCNC: 138 MMOL/L
TRIGL SERPL-MCNC: 91 MG/DL
WBC # BLD AUTO: 15.17 K/UL
WBC # BLD AUTO: 15.17 K/UL

## 2019-01-04 PROCEDURE — 85025 COMPLETE CBC W/AUTO DIFF WBC: CPT

## 2019-01-04 PROCEDURE — 94761 N-INVAS EAR/PLS OXIMETRY MLT: CPT

## 2019-01-04 PROCEDURE — 93010 ELECTROCARDIOGRAM REPORT: CPT | Mod: ,,, | Performed by: INTERNAL MEDICINE

## 2019-01-04 PROCEDURE — 99232 SBSQ HOSP IP/OBS MODERATE 35: CPT | Mod: ,,, | Performed by: HOSPITALIST

## 2019-01-04 PROCEDURE — 93005 ELECTROCARDIOGRAM TRACING: CPT

## 2019-01-04 PROCEDURE — 25000003 PHARM REV CODE 250: Performed by: HOSPITALIST

## 2019-01-04 PROCEDURE — 25000003 PHARM REV CODE 250: Performed by: NURSE PRACTITIONER

## 2019-01-04 PROCEDURE — 11000001 HC ACUTE MED/SURG PRIVATE ROOM

## 2019-01-04 PROCEDURE — S0030 INJECTION, METRONIDAZOLE: HCPCS | Performed by: NURSE PRACTITIONER

## 2019-01-04 PROCEDURE — 93010 EKG 12-LEAD: ICD-10-PCS | Mod: ,,, | Performed by: INTERNAL MEDICINE

## 2019-01-04 PROCEDURE — 80053 COMPREHEN METABOLIC PANEL: CPT

## 2019-01-04 PROCEDURE — 84100 ASSAY OF PHOSPHORUS: CPT

## 2019-01-04 PROCEDURE — 99232 PR SUBSEQUENT HOSPITAL CARE,LEVL II: ICD-10-PCS | Mod: ,,, | Performed by: HOSPITALIST

## 2019-01-04 PROCEDURE — 63600175 PHARM REV CODE 636 W HCPCS: Performed by: NURSE PRACTITIONER

## 2019-01-04 PROCEDURE — 80061 LIPID PANEL: CPT

## 2019-01-04 PROCEDURE — 83735 ASSAY OF MAGNESIUM: CPT

## 2019-01-04 RX ADMIN — OXYBUTYNIN CHLORIDE 10 MG: 5 TABLET, EXTENDED RELEASE ORAL at 08:01

## 2019-01-04 RX ADMIN — MORPHINE SULFATE 4 MG: 4 INJECTION INTRAVENOUS at 03:01

## 2019-01-04 RX ADMIN — NIFEDIPINE 30 MG: 30 TABLET, FILM COATED, EXTENDED RELEASE ORAL at 08:01

## 2019-01-04 RX ADMIN — MORPHINE SULFATE 4 MG: 4 INJECTION INTRAVENOUS at 08:01

## 2019-01-04 RX ADMIN — CIPROFLOXACIN 400 MG: 2 INJECTION, SOLUTION INTRAVENOUS at 08:01

## 2019-01-04 RX ADMIN — SODIUM CHLORIDE: 0.9 INJECTION, SOLUTION INTRAVENOUS at 08:01

## 2019-01-04 RX ADMIN — FAMOTIDINE 20 MG: 20 TABLET ORAL at 08:01

## 2019-01-04 RX ADMIN — LOSARTAN POTASSIUM 100 MG: 50 TABLET, FILM COATED ORAL at 08:01

## 2019-01-04 RX ADMIN — APIXABAN 5 MG: 2.5 TABLET, FILM COATED ORAL at 08:01

## 2019-01-04 RX ADMIN — METRONIDAZOLE 500 MG: 500 INJECTION, SOLUTION INTRAVENOUS at 05:01

## 2019-01-04 RX ADMIN — METRONIDAZOLE 500 MG: 500 INJECTION, SOLUTION INTRAVENOUS at 10:01

## 2019-01-04 RX ADMIN — MORPHINE SULFATE 4 MG: 4 INJECTION INTRAVENOUS at 11:01

## 2019-01-04 RX ADMIN — PANTOPRAZOLE SODIUM 40 MG: 40 TABLET, DELAYED RELEASE ORAL at 08:01

## 2019-01-04 RX ADMIN — PREDNISONE 1 MG: 1 TABLET ORAL at 08:01

## 2019-01-04 RX ADMIN — METRONIDAZOLE 500 MG: 500 INJECTION, SOLUTION INTRAVENOUS at 02:01

## 2019-01-04 NOTE — UM SECONDARY REVIEW
Inpt medicare d: colitis -  level care issue - emailed to ehr  team - 1-3 @ 844 - tdb er admit after hr team

## 2019-01-04 NOTE — H&P
[facility] Hospital Medicine  History & Physical    Patient Name: Lakshmi Juarez  MRN: [id number]  Admission Date: 1/3/2019  Attending Physician: Maria Del Carmen Wild MD   Primary Care Provider: Bisi Rey MD         Patient information was obtained from patient, past medical records and ER records.     Subjective:     Principal Problem:Colitis    Chief Complaint:   Chief Complaint   Patient presents with    GI Problem     sent from PCP for bowel obstruction confirmed by x-ray. reports loose BM today with nausea and severe pain        HPI: The patient is a 83 y.o. female with hx of HTN and colon CA who presents with complaint of abdominal pain since yesterday evening. She states it started to the back, but moved to the mid abdomen. There is associated nausea, diarrhea, and abdominal distension. She had two episodes of diarrhea. Last bowel movement was about 4 hours ago. She also reports lack of appetite. Pt is able to pass gas. Pt was seen at urgent care and abdominal x-ray at that time revealed probably bowel obstruction. Pt has PShx of total abdominal hysterectomy with bilateral salpingoophorectomy, partial colon resection, and cardiac valve replacement. She may have had an appendectomy, but is unsure. She denies any fever, chills, vomiting, chest pain, SOB, lightheadedness, and weakness. No urinary sx.        Past Medical History:   Diagnosis Date    Cancer     GERD (gastroesophageal reflux disease)     Hypertension        Past Surgical History:   Procedure Laterality Date    CARDIAC VALVE REPLACEMENT      INSERTION-INTRAOCULAR LENS (IOL) Left 7/26/2016    Performed by [facility]    PHACOEMULSIFICATION-ASPIRATION-CATARACT Left 7/26/2016    [facility] at [facility]    TOTAL ABDOMINAL HYSTERECTOMY W/ BILATERAL SALPINGOOPHORECTOMY         Review of patient's allergies indicates:   Allergen Reactions    Cashew nut      Abdominal pain    Unable to  assess      Freon- facial swelling       No current facility-administered medications on file prior to encounter.      Current Outpatient Medications on File Prior to Encounter   Medication Sig    aspirin (ECOTRIN) 81 MG EC tablet Take 81 mg by mouth once daily.    cholecalciferol, vitamin D3, (VITAMIN D3) 2,000 unit Cap Take 1 capsule by mouth once daily.    DYMISTA 137-50 mcg/spray Spry nassal spray 1 spray by Each Nare route 2 (two) times daily.    ELIQUIS 5 mg Tab TAKE 1 TABLET BY MOUTH TWICE DAILY    ELIQUIS 5 mg Tab TAKE 1 TABLET BY MOUTH TWICE DAILY    ELIQUIS 5 mg Tab TAKE 1 TABLET BY MOUTH TWICE DAILY    losartan (COZAAR) 100 MG tablet Take 100 mg by mouth once daily.    NIFEdipine (PROCARDIA-XL) 30 MG (OSM) 24 hr tablet TAKE 1 TABLET(30 MG) BY MOUTH EVERY DAY    oxybutynin (DITROPAN XL) 10 MG 24 hr tablet Take 10 mg by mouth once daily.    pantoprazole (PROTONIX) 40 MG tablet Take 40 mg by mouth once daily.    predniSONE (DELTASONE) 1 MG tablet Take 1 mg by mouth once daily.    ranitidine (ZANTAC) 150 MG tablet      Family History     Problem Relation (Age of Onset)    Cancer Brother    Parkinsonism Sister        Tobacco Use    Smoking status: Former Smoker    Smokeless tobacco: Never Used   Substance and Sexual Activity    Alcohol use: Yes    Drug use: No    Sexual activity: No     Review of Systems   Constitutional: Positive for appetite change and fatigue. Negative for activity change and fever.   HENT: Negative for congestion, ear pain, rhinorrhea and sinus pressure.    Eyes: Negative for pain and discharge.   Respiratory: Negative for cough, chest tightness, shortness of breath and wheezing.    Cardiovascular: Negative for chest pain and leg swelling.   Gastrointestinal: Positive for abdominal distention and diarrhea. Negative for abdominal pain, nausea and vomiting.   Endocrine: Negative for cold intolerance and heat intolerance.   Genitourinary: Negative for difficulty urinating,  flank pain, frequency, hematuria and urgency.   Musculoskeletal: Positive for arthralgias and myalgias. Negative for joint swelling.   Allergic/Immunologic: Negative for environmental allergies and food allergies.   Neurological: Negative for dizziness, weakness, light-headedness and headaches.   Hematological: Does not bruise/bleed easily.   Psychiatric/Behavioral: Negative for agitation, behavioral problems and decreased concentration.     Objective:     Vital Signs (Most Recent):  Temp: 98.5 °F (36.9 °C) (01/03/19 2222)  Pulse: 104 (01/03/19 2222)  Resp: 19 (01/03/19 2222)  BP: (!) 110/53 (01/03/19 2222)  SpO2: (!) 94 % (01/03/19 2222) Vital Signs (24h Range):  Temp:  [97 °F (36.1 °C)-98.5 °F (36.9 °C)] 98.5 °F (36.9 °C)  Pulse:  [104-111] 104  Resp:  [16-19] 19  SpO2:  [94 %-100 %] 94 %  BP: (108-132)/(53-70) 110/53     Weight: 68.1 kg (150 lb 2.1 oz)  Body mass index is 23.51 kg/m².    Physical Exam   Constitutional: She is oriented to person, place, and time. She appears well-developed and well-nourished.   HENT:   Head: Normocephalic.   Eyes: Conjunctivae are normal. Right eye exhibits no discharge. Left eye exhibits no discharge.   Neck: Normal range of motion. Neck supple.   Cardiovascular: Regular rhythm, normal heart sounds and intact distal pulses. Tachycardia present.   Pulses:       Radial pulses are 1+ on the right side, and 1+ on the left side.   Pulmonary/Chest: Effort normal and breath sounds normal. Tachypnea noted. No respiratory distress.   Abdominal: Soft. She exhibits no distension. Bowel sounds are increased. There is generalized tenderness.   Musculoskeletal: Normal range of motion.   Neurological: She is alert and oriented to person, place, and time. She has normal strength. GCS eye subscore is 4. GCS verbal subscore is 5. GCS motor subscore is 6.   Skin: Skin is warm and dry.   Psychiatric: She has a normal mood and affect. Her speech is normal and behavior is normal.           Significant  Labs:   CBC:   Recent Labs   Lab 01/03/19  1628   WBC 18.69*   HGB 14.2   HCT 42.8        CMP:   Recent Labs   Lab 01/03/19  1628      K 3.8      CO2 24   *   BUN 19   CREATININE 0.9   CALCIUM 9.6   PROT 7.1   ALBUMIN 4.0   BILITOT 2.9*   ALKPHOS 99   AST 21   ALT 20   ANIONGAP 10   EGFRNONAA >60       Significant Imaging: I have reviewed all pertinent imaging results/findings within the past 24 hours.    Assessment/Plan:     * Colitis    CT- Marked bowel wall thickening of the cecum with associated fat stranding.  Appendix is not definitively visualized.  Findings may represent colitis although an underlying mass is not excluded.    Cipro/Flagyl continued  NPO    Morphine  Zofran       HTN (hypertension)    Normotensive currently    Continue losartan, nifedipine         VTE Risk Mitigation (From admission, onward)        Ordered     apixaban tablet 5 mg  2 times daily      01/03/19 2228     IP VTE HIGH RISK PATIENT  Once      01/03/19 2228     Reason for No Pharmacological VTE Prophylaxis  Once      01/03/19 2228     Place JOCELIN hose  Until discontinued      01/03/19 2228     Place sequential compression device  Until discontinued      01/03/19 2228     Place sequential compression device  Until discontinued      01/03/19 2059     Place JOCELIN hose  Until discontinued      01/03/19 2053             Aquilino Ludwig NP  Department of Hospital Medicine   [facility]

## 2019-01-04 NOTE — CONSULTS
Gastroenterology Consult    1/4/2019  9:05 AM    Consulting Physician:  William Rodriguez MD    Primary Care Provider: Bisi Rey MD    Reason for consultation: Abdominal pain    HPI:  Lakshmi Juarez is a 83 y.o. female who presents with complaints of RLQ abdominal pain.  She states that the pain started abruptly after a day of running errands.  Initially the pain was in her lower back, but moved to the RLQ after laying down to rest.  She can identify no triggers of the pain.  Aggravated by activity.  No alleviating factors.  She reports two loose BM's, some associated nausea.  She went initially to urgent care and was referred to the ED.  CT imaging with evidence of bowel wall thickening of the cecum with associated pericecal fat stranding.  No fever or chills.  No overt GI blood loss.  She reports a history of colon cancer, but her colonoscopies were reviewed back to 2000 with history of colon polyps, but no history of colon cancer.  Last colonoscopy was with Dr. Romo which showed sigmoid diverticulosis, internal hemorrhoids, and mild proctitis.    Allergies:   Review of patient's allergies indicates:   Allergen Reactions    Cashew nut      Abdominal pain    Unable to assess      Freon- facial swelling       Current Medications:    Current Facility-Administered Medications:     0.9%  NaCl infusion, , Intravenous, Continuous, Aquilino Ludwig NP, Last Rate: 100 mL/hr at 01/04/19 0829    acetaminophen tablet 650 mg, 650 mg, Oral, Q4H PRN, Aquilino Ludwig NP    apixaban tablet 5 mg, 5 mg, Oral, BID, Aquilino Ludwig NP, 5 mg at 01/04/19 0825    ciprofloxacin (CIPRO)400mg/200ml D5W IVPB 400 mg, 400 mg, Intravenous, Q12H, Aquilino Ludwig NP, Last Rate: 200 mL/hr at 01/04/19 0827, 400 mg at 01/04/19 0827    famotidine tablet 20 mg, 20 mg, Oral, BID, Aquilino Ludwig NP, 20 mg at 01/04/19 0827    losartan tablet 100 mg, 100 mg, Oral, Daily, Aquilino Ludwig NP, 100 mg at 01/04/19  0824    metronidazole IVPB 500 mg, 500 mg, Intravenous, Q8H, Aquilino Ludwig NP, Last Rate: 100 mL/hr at 01/04/19 0510, 500 mg at 01/04/19 0510    morphine injection 4 mg, 4 mg, Intravenous, Q4H PRN, Aquilino Ludwig NP, 4 mg at 01/04/19 0348    NIFEdipine 24 hr tablet 30 mg, 30 mg, Oral, Daily, Aquilino Ludwig NP, 30 mg at 01/04/19 0825    ondansetron disintegrating tablet 8 mg, 8 mg, Oral, Q8H PRN, Aquilino Ludwig NP    oxybutynin 24 hr tablet 10 mg, 10 mg, Oral, Daily, Aquilino Ludwig NP, 10 mg at 01/04/19 0825    pantoprazole EC tablet 40 mg, 40 mg, Oral, Daily, Aquilino Ludwig NP, 40 mg at 01/04/19 0825    predniSONE tablet 1 mg, 1 mg, Oral, Daily, Aquilino Ludwig NP, 1 mg at 01/04/19 0827    sodium chloride 0.9% flush 5 mL, 5 mL, Intravenous, PRN, Aquilino Ludwig NP      Social History:  Social History     Socioeconomic History    Marital status:      Spouse name: None    Number of children: None    Years of education: None    Highest education level: None   Social Needs    Financial resource strain: None    Food insecurity - worry: None    Food insecurity - inability: None    Transportation needs - medical: None    Transportation needs - non-medical: None   Occupational History    None   Tobacco Use    Smoking status: Former Smoker    Smokeless tobacco: Never Used   Substance and Sexual Activity    Alcohol use: Yes    Drug use: No    Sexual activity: No   Other Topics Concern    None   Social History Narrative    None       Surgical History:  Past Surgical History:   Procedure Laterality Date    CARDIAC VALVE REPLACEMENT      INSERTION-INTRAOCULAR LENS (IOL) Left 7/26/2016    [facility]    PHACOEMULSIFICATION-ASPIRATION-CATARACT Left 7/26/2016    [facility]    TOTAL ABDOMINAL HYSTERECTOMY W/ BILATERAL SALPINGOOPHORECTOMY           Family History:  Family History   Problem Relation Age  of Onset    Parkinsonism Sister     Cancer Brother        Review of systems:   ROS     Constitutional: Positive for appetite change and fatigue. Negative for activity change and fever.   HENT: Negative for congestion, ear pain, rhinorrhea and sinus pressure.    Eyes: Negative for pain and discharge.   Respiratory: Negative for cough, chest tightness, shortness of breath and wheezing.    Cardiovascular: Negative for chest pain and leg swelling.   Gastrointestinal: Positive for abdominal distention and diarrhea. Negative for abdominal pain, nausea and vomiting.   Endocrine: Negative for cold intolerance and heat intolerance.   Genitourinary: Negative for difficulty urinating, flank pain, frequency, hematuria and urgency.   Musculoskeletal: Positive for arthralgias and myalgias. Negative for joint swelling.   Allergic/Immunologic: Negative for environmental allergies and food allergies.   Neurological: Negative for dizziness, weakness, light-headedness and headaches.   Hematological: Does not bruise/bleed easily.   Psychiatric/Behavioral: Negative for agitation, behavioral problems and decreased concentratio        Physical Exam:  Temp:  [97 °F (36.1 °C)-98.9 °F (37.2 °C)] 98.9 °F (37.2 °C)  Pulse:  [] 98  Resp:  [16-19] 16  SpO2:  [94 %-100 %] 94 %  BP: (108-132)/(53-70) 117/56    General: Well developed, well nourished, female in no acute distress.  Patient is alert and oriented  Eyes:  Anicteric sclera, PERRLA  ENT:  Moist mucous membranes, no drainage from ears or nose  Neck:  Supple, no nodes or masses felt, no thyromegaly  Cardiovascular:  Regular rate and rhythm without murmur  Lungs:  Clear to auscultation with normal effort; no wheezes or rales noted  GI:  Soft, TTP in the RLQ with no rebound or guarding, bowel sounds present  Musculoskeletal:  No clubbing, cyanosis, or edema  Neurologic:  No focal defecits    Labs:  Results for GIRISH STOUT (MRN [id number]) as of 1/4/2019 09:12   Ref. Range 1/4/2019  05:30   WBC Latest Ref Range: 3.90 - 12.70 K/uL 15.17 (H)   RBC Latest Ref Range: 4.00 - 5.40 M/uL 3.99 (L)   Hemoglobin Latest Ref Range: 12.0 - 16.0 g/dL 12.1   Hematocrit Latest Ref Range: 37.0 - 48.5 % 37.2   MCV Latest Ref Range: 82 - 98 fL 93   MCH Latest Ref Range: 27.0 - 31.0 pg 30.3   MCHC Latest Ref Range: 32.0 - 36.0 g/dL 32.5   RDW Latest Ref Range: 11.5 - 14.5 % 14.7 (H)   Platelets Latest Ref Range: 150 - 350 K/uL 222   MPV Latest Ref Range: 9.2 - 12.9 fL 10.8   Gran% Latest Ref Range: 38.0 - 73.0 % 81.0 (H)   Gran # (ANC) Latest Ref Range: 1.8 - 7.7 K/uL 12.3 (H)   Lymph% Latest Ref Range: 18.0 - 48.0 % 10.7 (L)   Lymph # Latest Ref Range: 1.0 - 4.8 K/uL 1.6   Mono% Latest Ref Range: 4.0 - 15.0 % 7.6   Mono # Latest Ref Range: 0.3 - 1.0 K/uL 1.2 (H)   Eosinophil% Latest Ref Range: 0.0 - 8.0 % 0.2   Eos # Latest Ref Range: 0.0 - 0.5 K/uL 0.0   Basophil% Latest Ref Range: 0.0 - 1.9 % 0.2   Baso # Latest Ref Range: 0.00 - 0.20 K/uL 0.03   Differential Method Unknown Automated   Results for GIRISH STOUT (MRN [id number]) as of 1/4/2019 09:12   Ref. Range 1/4/2019 05:29   Sodium Latest Ref Range: 136 - 145 mmol/L 138   Potassium Latest Ref Range: 3.5 - 5.1 mmol/L 3.7   Chloride Latest Ref Range: 95 - 110 mmol/L 109   CO2 Latest Ref Range: 23 - 29 mmol/L 23   Anion Gap Latest Ref Range: 8 - 16 mmol/L 6 (L)   BUN, Bld Latest Ref Range: 8 - 23 mg/dL 14   Creatinine Latest Ref Range: 0.5 - 1.4 mg/dL 0.9   eGFR if non African American Latest Ref Range: >60 mL/min/1.73 m^2 >60   eGFR if  Latest Ref Range: >60 mL/min/1.73 m^2 >60   Glucose Latest Ref Range: 70 - 110 mg/dL 94   Calcium Latest Ref Range: 8.7 - 10.5 mg/dL 8.4 (L)   Alkaline Phosphatase Latest Ref Range: 55 - 135 U/L 86   Total Protein Latest Ref Range: 6.0 - 8.4 g/dL 5.5 (L)   Albumin Latest Ref Range: 3.5 - 5.2 g/dL 2.9 (L)   Total Bilirubin Latest Ref Range: 0.1 - 1.0 mg/dL 1.7 (H)   AST Latest Ref Range: 10 - 40 U/L 16   ALT Latest  Ref Range: 10 - 44 U/L 15       Imaging and Other Studies:  X-Ray Abdomen Flat And Erect   Impression       1. Borderline dilated loops of small bowel with scattered air-fluid levels and air/stool throughout the colon suggesting a low grade partial versus early complete bowel obstruction.  Consider CT abdomen/pelvis for more accurate assessment if clinically indicated.     CT Abdomen Pelvis With Contrast  Impression       Marked bowel wall thickening of the cecum with associated fat stranding.  Appendix is not definitively visualized.  Findings may represent colitis although an underlying mass is not excluded.    Cholelithiasis       Assessment:  1.  Cecal wall thickening with associated fat stranding  2.  RLQ abdominal pain    Plan:  1.  Clear liquid diet  2.  Continue with broad spectrum abx, Cipro/Flagyl should cover  3.  Serial abdominal exams with low threshold for surgical consultation should symptoms worsent  4.  Doubt cecal mass given lack of findings on colonoscopy in 2016 with Dr. Demetrius Rodriguez

## 2019-01-04 NOTE — SUBJECTIVE & OBJECTIVE
Past Medical History:   Diagnosis Date    Cancer     GERD (gastroesophageal reflux disease)     Hypertension        Past Surgical History:   Procedure Laterality Date    CARDIAC VALVE REPLACEMENT      INSERTION-INTRAOCULAR LENS (IOL) Left 7/26/2016    Performed by Mimi Harrison MD at [facility], operated by [facility] OR    PHACOEMULSIFICATION-ASPIRATION-CATARACT Left 7/26/2016    Performed by Mimi Harrison MD at [facility], operated by [facility] OR    TOTAL ABDOMINAL HYSTERECTOMY W/ BILATERAL SALPINGOOPHORECTOMY         Review of patient's allergies indicates:   Allergen Reactions    Cashew nut      Abdominal pain    Unable to assess      Freon- facial swelling       No current facility-administered medications on file prior to encounter.      Current Outpatient Medications on File Prior to Encounter   Medication Sig    aspirin (ECOTRIN) 81 MG EC tablet Take 81 mg by mouth once daily.    cholecalciferol, vitamin D3, (VITAMIN D3) 2,000 unit Cap Take 1 capsule by mouth once daily.    DYMISTA 137-50 mcg/spray Spry nassal spray 1 spray by Each Nare route 2 (two) times daily.    ELIQUIS 5 mg Tab TAKE 1 TABLET BY MOUTH TWICE DAILY    ELIQUIS 5 mg Tab TAKE 1 TABLET BY MOUTH TWICE DAILY    ELIQUIS 5 mg Tab TAKE 1 TABLET BY MOUTH TWICE DAILY    losartan (COZAAR) 100 MG tablet Take 100 mg by mouth once daily.    NIFEdipine (PROCARDIA-XL) 30 MG (OSM) 24 hr tablet TAKE 1 TABLET(30 MG) BY MOUTH EVERY DAY    oxybutynin (DITROPAN XL) 10 MG 24 hr tablet Take 10 mg by mouth once daily.    pantoprazole (PROTONIX) 40 MG tablet Take 40 mg by mouth once daily.    predniSONE (DELTASONE) 1 MG tablet Take 1 mg by mouth once daily.    ranitidine (ZANTAC) 150 MG tablet      Family History     Problem Relation (Age of Onset)    Cancer Brother    Parkinsonism Sister        Tobacco Use    Smoking status: Former Smoker    Smokeless tobacco: Never Used   Substance and Sexual Activity    Alcohol use: Yes    Drug use: No    Sexual activity: No     Review of Systems    Constitutional: Positive for appetite change and fatigue. Negative for activity change and fever.   HENT: Negative for congestion, ear pain, rhinorrhea and sinus pressure.    Eyes: Negative for pain and discharge.   Respiratory: Negative for cough, chest tightness, shortness of breath and wheezing.    Cardiovascular: Negative for chest pain and leg swelling.   Gastrointestinal: Positive for abdominal distention and diarrhea. Negative for abdominal pain, nausea and vomiting.   Endocrine: Negative for cold intolerance and heat intolerance.   Genitourinary: Negative for difficulty urinating, flank pain, frequency, hematuria and urgency.   Musculoskeletal: Positive for arthralgias and myalgias. Negative for joint swelling.   Allergic/Immunologic: Negative for environmental allergies and food allergies.   Neurological: Negative for dizziness, weakness, light-headedness and headaches.   Hematological: Does not bruise/bleed easily.   Psychiatric/Behavioral: Negative for agitation, behavioral problems and decreased concentration.     Objective:     Vital Signs (Most Recent):  Temp: 98.5 °F (36.9 °C) (01/03/19 2222)  Pulse: 104 (01/03/19 2222)  Resp: 19 (01/03/19 2222)  BP: (!) 110/53 (01/03/19 2222)  SpO2: (!) 94 % (01/03/19 2222) Vital Signs (24h Range):  Temp:  [97 °F (36.1 °C)-98.5 °F (36.9 °C)] 98.5 °F (36.9 °C)  Pulse:  [104-111] 104  Resp:  [16-19] 19  SpO2:  [94 %-100 %] 94 %  BP: (108-132)/(53-70) 110/53     Weight: 68.1 kg (150 lb 2.1 oz)  Body mass index is 23.51 kg/m².    Physical Exam   Constitutional: She is oriented to person, place, and time. She appears well-developed and well-nourished.   HENT:   Head: Normocephalic.   Eyes: Conjunctivae are normal. Right eye exhibits no discharge. Left eye exhibits no discharge.   Neck: Normal range of motion. Neck supple.   Cardiovascular: Regular rhythm, normal heart sounds and intact distal pulses. Tachycardia present.   Pulses:       Radial pulses are 1+ on the right  side, and 1+ on the left side.   Pulmonary/Chest: Effort normal and breath sounds normal. Tachypnea noted. No respiratory distress.   Abdominal: Soft. She exhibits no distension. Bowel sounds are increased. There is generalized tenderness.   Musculoskeletal: Normal range of motion.   Neurological: She is alert and oriented to person, place, and time. She has normal strength. GCS eye subscore is 4. GCS verbal subscore is 5. GCS motor subscore is 6.   Skin: Skin is warm and dry.   Psychiatric: She has a normal mood and affect. Her speech is normal and behavior is normal.           Significant Labs:   CBC:   Recent Labs   Lab 01/03/19  1628   WBC 18.69*   HGB 14.2   HCT 42.8        CMP:   Recent Labs   Lab 01/03/19  1628      K 3.8      CO2 24   *   BUN 19   CREATININE 0.9   CALCIUM 9.6   PROT 7.1   ALBUMIN 4.0   BILITOT 2.9*   ALKPHOS 99   AST 21   ALT 20   ANIONGAP 10   EGFRNONAA >60       Significant Imaging: I have reviewed all pertinent imaging results/findings within the past 24 hours.

## 2019-01-04 NOTE — ED TRIAGE NOTES
Pt reports back pain yesterday after doing the dishes. She reports that she went to lay down and the pain traveled to her abd. Pain persisted this am. Reports episode of nausea and 2 soft stools. Was seen at  and sent to rule out SBO.

## 2019-01-04 NOTE — ED NOTES
Received report from Shaina SHELLEY, assumed care of pt at this time, pt resting in bed calmly RR easy non labored, NAD. AAOx4, VSS, side rails up x2, call light within reach, bed in lowest locked position, will continue to monitor and assess for changes.

## 2019-01-04 NOTE — SUBJECTIVE & OBJECTIVE
Interval History: The patient continues to have nausea and abdominal pain with minimal appetite.    Review of Systems   Gastrointestinal: Positive for abdominal pain and nausea.     Objective:     Vital Signs (Most Recent):  Temp: 98.8 °F (37.1 °C) (01/04/19 1144)  Pulse: 108 (01/04/19 1200)  Resp: 18 (01/04/19 1144)  BP: (!) 126/56 (01/04/19 1144)  SpO2: 98 % (01/04/19 1144) Vital Signs (24h Range):  Temp:  [98.5 °F (36.9 °C)-98.9 °F (37.2 °C)] 98.8 °F (37.1 °C)  Pulse:  [] 108  Resp:  [16-19] 18  SpO2:  [94 %-98 %] 98 %  BP: (108-132)/(53-62) 126/56     Weight: 68.1 kg (150 lb 2.1 oz)  Body mass index is 23.51 kg/m².    Intake/Output Summary (Last 24 hours) at 1/4/2019 1430  Last data filed at 1/4/2019 0602  Gross per 24 hour   Intake 2200 ml   Output --   Net 2200 ml      Physical Exam   Constitutional: She is oriented to person, place, and time. She appears well-developed and well-nourished.   Acutely ill appearing elderly female in moderate distress with abdominal pain   HENT:   Head: Normocephalic.   Eyes: Conjunctivae are normal. Right eye exhibits no discharge. Left eye exhibits no discharge.   Neck: Normal range of motion. Neck supple.   Cardiovascular: Regular rhythm, normal heart sounds and intact distal pulses. Tachycardia present.   Pulses:       Radial pulses are 1+ on the right side, and 1+ on the left side.   Pulmonary/Chest: Effort normal and breath sounds normal. No respiratory distress.   Abdominal: Soft. Bowel sounds are normal. She exhibits no distension. There is generalized tenderness.   Musculoskeletal: Normal range of motion.   Neurological: She is alert and oriented to person, place, and time. She has normal strength. GCS eye subscore is 4. GCS verbal subscore is 5. GCS motor subscore is 6.   Skin: Skin is warm and dry.   Psychiatric: She has a normal mood and affect. Her speech is normal and behavior is normal. Judgment and thought content normal.     Significant Labs:   Blood  Culture:   Recent Labs   Lab 01/03/19  1925   LABBLOO No Growth to date  No Growth to date     CBC:   Recent Labs   Lab 01/03/19  1628 01/04/19  0530   WBC 18.69* 15.17*  15.17*   HGB 14.2 12.1  12.1   HCT 42.8 37.2  37.2    222  222     CMP:   Recent Labs   Lab 01/03/19  1628 01/04/19  0529    138  138   K 3.8 3.7  3.7    109  109   CO2 24 23  23   * 94  94   BUN 19 14  14   CREATININE 0.9 0.9  0.9   CALCIUM 9.6 8.4*  8.4*   PROT 7.1 5.5*  5.5*   ALBUMIN 4.0 2.9*  2.9*   BILITOT 2.9* 1.7*  1.7*   ALKPHOS 99 86  86   AST 21 16  16   ALT 20 15  15   ANIONGAP 10 6*  6*   EGFRNONAA >60 >60  >60       Significant Imaging: I have reviewed and interpreted all pertinent imaging results/findings within the past 24 hours.

## 2019-01-04 NOTE — ASSESSMENT & PLAN NOTE
CT- Marked bowel wall thickening of the cecum with associated fat stranding.  Appendix is not definitively visualized.  Findings may represent colitis although an underlying mass is not excluded.    Cipro/Flagyl continued  NPO    Morphine  Zofran

## 2019-01-04 NOTE — ASSESSMENT & PLAN NOTE
Inflammatory changes in the cecum  IV Cipro/Flagyl to continue  Clear liquid diet  Hydration  Appropriate analgesia  Monitor closely for any signs of obstruction

## 2019-01-04 NOTE — PLAN OF CARE
Problem: Adult Inpatient Plan of Care  Goal: Plan of Care Review  Outcome: Ongoing (interventions implemented as appropriate)  No significant events this shift, free from falls skin breakdown or injury. Resp even and unlabored on room air. AAOx4. Was NPO but advanced to clear liquids, tolerating well. Pain noted occasionally to abd, well controlled with prn pain medication. Ambulating in room, BRP. Vss, afebrile. Bed in lowest locked position, side rails elevated, cb in reach. Hob elevated. Purposeful rounding done every hour.

## 2019-01-04 NOTE — HPI
The patient is a 83 y.o. female with hx of HTN and colon CA who presents with complaint of abdominal pain since yesterday evening. She states it started to the back, but moved to the mid abdomen. There is associated nausea, diarrhea, and abdominal distension. She had two episodes of diarrhea. Last bowel movement was about 4 hours ago. She also reports lack of appetite. Pt is able to pass gas. Pt was seen at urgent care and abdominal x-ray at that time revealed probably bowel obstruction. Pt has PShx of total abdominal hysterectomy with bilateral salpingoophorectomy, partial colon resection, and cardiac valve replacement. She may have had an appendectomy, but is unsure. She denies any fever, chills, vomiting, chest pain, SOB, lightheadedness, and weakness. No urinary sx.

## 2019-01-04 NOTE — ED NOTES
Pt resting in bed calmly RR easy non labored, NAD. Negative further complaints of pain or distress, AAOx4, VSS, pt updated on current plan of care, side rails up x2, call light within reach, bed in lowest locked position, will continue to monitor and assess for changes.

## 2019-01-04 NOTE — PLAN OF CARE
Problem: Adult Inpatient Plan of Care  Goal: Plan of Care Review  Outcome: Ongoing (interventions implemented as appropriate)  Patient has a dx of Colitis. AAOx4, on RA, VSS, afebrile. Scheduled antibiotics given. Pain managed effectively with prn Morphine 4mg IVP x 2. Up ad shaji to the bathroom.

## 2019-01-04 NOTE — HOSPITAL COURSE
CT Abd/Pelvis demonstrated marked bowel wall thickening of cecum with associated fat stranding and she was subsequently started on IV ciprofloxacin and metronidazole. GI was consulted and based on colonoscopy from 2016 did not recommend endoscopy currently. She developed abdominal distention, nausea, and vomiting and abdominal X-ray demonstrated distended bowel loops consistent with ileus/SBO. NGT was placed and put to suction initially and general surgery was consulted, recommending conservative medical management. She completed 7 days of antibiotic therapy which was subsequently discontinued, and NGT removed on 01/10 with tolerance of clear liquids. She had bowel movement, after which she was advanced to low residue diet and tolerated it well. She will be discharged on stool softener with PCP follow-up in the near future.

## 2019-01-04 NOTE — ED NOTES
Patient notified of need for urine sample, cup and bedside and told to inform staff when ready to void.

## 2019-01-04 NOTE — ED NOTES
Pt unable to provide urine specimen at this time, pt instructed to press call bell whenever she feels the urge to do so.

## 2019-01-04 NOTE — PLAN OF CARE
SW met with pt at bedside to complete discharge assessment, verified PCP and uses [facility] on [address].  Pt's spouse will provide transportation home.  No needs identified at this time.     01/04/19 1142   Discharge Assessment   Assessment Type Discharge Planning Assessment   Confirmed/corrected address and phone number on facesheet? Yes   Assessment information obtained from? Patient   Communicated expected length of stay with patient/caregiver no   Prior to hospitilization cognitive status: Alert/Oriented   Prior to hospitalization functional status: Independent   Current cognitive status: Alert/Oriented   Current Functional Status: Independent   Lives With spouse   Able to Return to Prior Arrangements yes   Is patient able to care for self after discharge? Yes   Readmission Within the Last 30 Days no previous admission in last 30 days   Patient currently being followed by outpatient case management? No   Patient currently receives any other outside agency services? No   Equipment Currently Used at Home none   Do you have any problems affording any of your prescribed medications? No   Is the patient taking medications as prescribed? yes   Does the patient have transportation home? Yes   Transportation Anticipated family or friend will provide   Does the patient receive services at the [facility]? No   Discharge Plan A Home   Patient/Family in Agreement with Plan yes

## 2019-01-05 LAB
ALBUMIN SERPL BCP-MCNC: 2.6 G/DL
ALP SERPL-CCNC: 83 U/L
ALT SERPL W/O P-5'-P-CCNC: 13 U/L
ANION GAP SERPL CALC-SCNC: 8 MMOL/L
AST SERPL-CCNC: 14 U/L
BASOPHILS # BLD AUTO: 0.03 K/UL
BASOPHILS NFR BLD: 0.3 %
BILIRUB SERPL-MCNC: 1.3 MG/DL
BUN SERPL-MCNC: 9 MG/DL
CALCIUM SERPL-MCNC: 8.4 MG/DL
CHLORIDE SERPL-SCNC: 110 MMOL/L
CO2 SERPL-SCNC: 23 MMOL/L
CREAT SERPL-MCNC: 0.8 MG/DL
DIFFERENTIAL METHOD: ABNORMAL
EOSINOPHIL # BLD AUTO: 0.2 K/UL
EOSINOPHIL NFR BLD: 1.5 %
ERYTHROCYTE [DISTWIDTH] IN BLOOD BY AUTOMATED COUNT: 14.9 %
EST. GFR  (AFRICAN AMERICAN): >60 ML/MIN/1.73 M^2
EST. GFR  (NON AFRICAN AMERICAN): >60 ML/MIN/1.73 M^2
GLUCOSE SERPL-MCNC: 95 MG/DL
HCT VFR BLD AUTO: 36.6 %
HGB BLD-MCNC: 11.9 G/DL
LYMPHOCYTES # BLD AUTO: 1.4 K/UL
LYMPHOCYTES NFR BLD: 12.5 %
MAGNESIUM SERPL-MCNC: 2 MG/DL
MCH RBC QN AUTO: 30.6 PG
MCHC RBC AUTO-ENTMCNC: 32.5 G/DL
MCV RBC AUTO: 94 FL
MONOCYTES # BLD AUTO: 1 K/UL
MONOCYTES NFR BLD: 8.9 %
NEUTROPHILS # BLD AUTO: 8.5 K/UL
NEUTROPHILS NFR BLD: 76.5 %
PHOSPHATE SERPL-MCNC: 3 MG/DL
PLATELET # BLD AUTO: 226 K/UL
PMV BLD AUTO: 11.1 FL
POTASSIUM SERPL-SCNC: 3.1 MMOL/L
PROT SERPL-MCNC: 5.3 G/DL
RBC # BLD AUTO: 3.89 M/UL
SODIUM SERPL-SCNC: 141 MMOL/L
WBC # BLD AUTO: 11.13 K/UL

## 2019-01-05 PROCEDURE — 99232 SBSQ HOSP IP/OBS MODERATE 35: CPT | Mod: ,,, | Performed by: HOSPITALIST

## 2019-01-05 PROCEDURE — 83735 ASSAY OF MAGNESIUM: CPT

## 2019-01-05 PROCEDURE — 25000003 PHARM REV CODE 250: Performed by: NURSE PRACTITIONER

## 2019-01-05 PROCEDURE — 36415 COLL VENOUS BLD VENIPUNCTURE: CPT

## 2019-01-05 PROCEDURE — 63600175 PHARM REV CODE 636 W HCPCS: Performed by: NURSE PRACTITIONER

## 2019-01-05 PROCEDURE — 11000001 HC ACUTE MED/SURG PRIVATE ROOM

## 2019-01-05 PROCEDURE — 94761 N-INVAS EAR/PLS OXIMETRY MLT: CPT

## 2019-01-05 PROCEDURE — 84100 ASSAY OF PHOSPHORUS: CPT

## 2019-01-05 PROCEDURE — S0030 INJECTION, METRONIDAZOLE: HCPCS | Performed by: NURSE PRACTITIONER

## 2019-01-05 PROCEDURE — 63600175 PHARM REV CODE 636 W HCPCS: Performed by: HOSPITALIST

## 2019-01-05 PROCEDURE — 80053 COMPREHEN METABOLIC PANEL: CPT

## 2019-01-05 PROCEDURE — 99232 PR SUBSEQUENT HOSPITAL CARE,LEVL II: ICD-10-PCS | Mod: ,,, | Performed by: HOSPITALIST

## 2019-01-05 PROCEDURE — 25000003 PHARM REV CODE 250: Performed by: HOSPITALIST

## 2019-01-05 PROCEDURE — 85025 COMPLETE CBC W/AUTO DIFF WBC: CPT

## 2019-01-05 RX ORDER — TRAMADOL HYDROCHLORIDE 50 MG/1
50 TABLET ORAL EVERY 4 HOURS PRN
Status: DISCONTINUED | OUTPATIENT
Start: 2019-01-05 | End: 2019-01-11 | Stop reason: HOSPADM

## 2019-01-05 RX ORDER — HYDROCODONE BITARTRATE AND ACETAMINOPHEN 7.5; 325 MG/1; MG/1
1 TABLET ORAL EVERY 6 HOURS PRN
Status: DISCONTINUED | OUTPATIENT
Start: 2019-01-05 | End: 2019-01-11 | Stop reason: HOSPADM

## 2019-01-05 RX ADMIN — SODIUM CHLORIDE AND POTASSIUM CHLORIDE: .9; .15 SOLUTION INTRAVENOUS at 11:01

## 2019-01-05 RX ADMIN — APIXABAN 5 MG: 2.5 TABLET, FILM COATED ORAL at 08:01

## 2019-01-05 RX ADMIN — METRONIDAZOLE 500 MG: 500 INJECTION, SOLUTION INTRAVENOUS at 05:01

## 2019-01-05 RX ADMIN — PANTOPRAZOLE SODIUM 40 MG: 40 TABLET, DELAYED RELEASE ORAL at 08:01

## 2019-01-05 RX ADMIN — FAMOTIDINE 20 MG: 20 TABLET ORAL at 08:01

## 2019-01-05 RX ADMIN — LOSARTAN POTASSIUM 100 MG: 50 TABLET, FILM COATED ORAL at 08:01

## 2019-01-05 RX ADMIN — OXYBUTYNIN CHLORIDE 10 MG: 5 TABLET, EXTENDED RELEASE ORAL at 08:01

## 2019-01-05 RX ADMIN — HYDROCODONE BITARTRATE AND ACETAMINOPHEN 1 TABLET: 7.5; 325 TABLET ORAL at 01:01

## 2019-01-05 RX ADMIN — CIPROFLOXACIN 400 MG: 2 INJECTION, SOLUTION INTRAVENOUS at 08:01

## 2019-01-05 RX ADMIN — NIFEDIPINE 30 MG: 30 TABLET, FILM COATED, EXTENDED RELEASE ORAL at 08:01

## 2019-01-05 RX ADMIN — METRONIDAZOLE 500 MG: 500 INJECTION, SOLUTION INTRAVENOUS at 01:01

## 2019-01-05 RX ADMIN — MORPHINE SULFATE 4 MG: 4 INJECTION INTRAVENOUS at 08:01

## 2019-01-05 RX ADMIN — METRONIDAZOLE 500 MG: 500 INJECTION, SOLUTION INTRAVENOUS at 10:01

## 2019-01-05 RX ADMIN — PREDNISONE 1 MG: 1 TABLET ORAL at 08:01

## 2019-01-05 NOTE — PROGRESS NOTES
[facility] Hospital Medicine  Progress Note    Patient Name: Lakshmi Juarez  MRN: [id number]  Patient Class: IP- Inpatient   Admission Date: 1/3/2019  Length of Stay: 2 days  Attending Physician: Maria Del Carmen Wild MD  Primary Care Provider: Bisi Rey MD        Subjective:     Principal Problem:Acute colitis    HPI:  The patient is a 83 y.o. female with hx of HTN and colon CA who presents with complaint of abdominal pain since yesterday evening. She states it started to the back, but moved to the mid abdomen. There is associated nausea, diarrhea, and abdominal distension. She had two episodes of diarrhea. Last bowel movement was about 4 hours ago. She also reports lack of appetite. Pt is able to pass gas. Pt was seen at urgent care and abdominal x-ray at that time revealed probably bowel obstruction. Pt has PShx of total abdominal hysterectomy with bilateral salpingoophorectomy, partial colon resection, and cardiac valve replacement. She may have had an appendectomy, but is unsure. She denies any fever, chills, vomiting, chest pain, SOB, lightheadedness, and weakness. No urinary sx.        Hospital Course:  CT of the abdomen/pelvis was done that revealed marked bowel wall thickening of the cecum with associated fat stranding.  The patient was started IV Cipro and Flagyl.  GI was consulted and based on colonoscopy that was done in 2016, does not recommend endoscopy at this time.       Interval History: The patient denies flatus or BM    Review of Systems   Gastrointestinal: Positive for abdominal distention, abdominal pain and constipation.     Objective:     Vital Signs (Most Recent):  Temp: 98.2 °F (36.8 °C) (01/05/19 1222)  Pulse: 101 (01/05/19 1222)  Resp: 18 (01/05/19 1222)  BP: (!) 107/54 (01/05/19 1222)  SpO2: (!) 94 % (01/05/19 1222) Vital Signs (24h Range):  Temp:  [98.2 °F (36.8 °C)-98.6 °F (37 °C)] 98.2 °F (36.8 °C)  Pulse:  [] 101  Resp:  [17-20] 18  SpO2:  [90 %-96 %] 94  %  BP: (107-117)/(53-57) 107/54     Weight: 68.1 kg (150 lb 2.1 oz)  Body mass index is 23.51 kg/m².    Intake/Output Summary (Last 24 hours) at 1/5/2019 1524  Last data filed at 1/5/2019 0519  Gross per 24 hour   Intake 2532.08 ml   Output --   Net 2532.08 ml      Physical Exam   Constitutional: She is oriented to person, place, and time. She appears well-developed and well-nourished.   Acutely ill appearing elderly female in moderate distress with abdominal pain   HENT:   Head: Normocephalic.   Eyes: Conjunctivae are normal. Right eye exhibits no discharge. Left eye exhibits no discharge.   Neck: Normal range of motion. Neck supple.   Cardiovascular: Regular rhythm, normal heart sounds and intact distal pulses. Tachycardia present.   Pulses:       Radial pulses are 1+ on the right side, and 1+ on the left side.   Pulmonary/Chest: Effort normal and breath sounds normal. No respiratory distress.   Abdominal: Soft. Bowel sounds are normal. She exhibits distension. There is generalized tenderness.   Musculoskeletal: Normal range of motion.   Neurological: She is alert and oriented to person, place, and time. She has normal strength. GCS eye subscore is 4. GCS verbal subscore is 5. GCS motor subscore is 6.   Skin: Skin is warm and dry.   Psychiatric: She has a normal mood and affect. Her speech is normal and behavior is normal. Judgment and thought content normal.       Significant Labs:   CBC:   Recent Labs   Lab 01/03/19  1628 01/04/19  0530 01/05/19  0453   WBC 18.69* 15.17*  15.17* 11.13   HGB 14.2 12.1  12.1 11.9*   HCT 42.8 37.2  37.2 36.6*    222  222 226     CMP:   Recent Labs   Lab 01/03/19  1628 01/04/19  0529 01/05/19  0453    138  138 141   K 3.8 3.7  3.7 3.1*    109  109 110   CO2 24 23  23 23   * 94  94 95   BUN 19 14  14 9   CREATININE 0.9 0.9  0.9 0.8   CALCIUM 9.6 8.4*  8.4* 8.4*   PROT 7.1 5.5*  5.5* 5.3*   ALBUMIN 4.0 2.9*  2.9* 2.6*   BILITOT 2.9* 1.7*  1.7*  1.3*   ALKPHOS 99 86  86 83   AST 21 16  16 14   ALT 20 15  15 13   ANIONGAP 10 6*  6* 8   EGFRNONAA >60 >60  >60 >60     Magnesium:   Recent Labs   Lab 01/04/19  0529 01/05/19  0453   MG 2.0 2.0       Significant Imaging: I have reviewed and interpreted all pertinent imaging results/findings within the past 24 hours.    Assessment/Plan:      * Acute colitis    Inflammatory changes in the cecum  IV Cipro/Flagyl to continue  Advance diet as tolerated  Adjust pain medication     GERD (gastroesophageal reflux disease)    Will continue PPI       Essential hypertension    Normotensive currently  Continue losartan, nifedipine         VTE Risk Mitigation (From admission, onward)        Ordered     apixaban tablet 5 mg  2 times daily      01/03/19 2228     IP VTE HIGH RISK PATIENT  Once      01/03/19 2228     Reason for No Pharmacological VTE Prophylaxis  Once      01/03/19 2228     Place JOCELIN hose  Until discontinued      01/03/19 2228     Place sequential compression device  Until discontinued      01/03/19 2228     Place sequential compression device  Until discontinued      01/03/19 2059     Place JOCELIN hose  Until discontinued      01/03/19 2053              Maria Del Carmen Wild MD  Department of Hospital Medicine   [facility]

## 2019-01-05 NOTE — SUBJECTIVE & OBJECTIVE
Interval History: The patient denies flatus or BM    Review of Systems   Gastrointestinal: Positive for abdominal distention, abdominal pain and constipation.     Objective:     Vital Signs (Most Recent):  Temp: 98.2 °F (36.8 °C) (01/05/19 1222)  Pulse: 101 (01/05/19 1222)  Resp: 18 (01/05/19 1222)  BP: (!) 107/54 (01/05/19 1222)  SpO2: (!) 94 % (01/05/19 1222) Vital Signs (24h Range):  Temp:  [98.2 °F (36.8 °C)-98.6 °F (37 °C)] 98.2 °F (36.8 °C)  Pulse:  [] 101  Resp:  [17-20] 18  SpO2:  [90 %-96 %] 94 %  BP: (107-117)/(53-57) 107/54     Weight: 68.1 kg (150 lb 2.1 oz)  Body mass index is 23.51 kg/m².    Intake/Output Summary (Last 24 hours) at 1/5/2019 1524  Last data filed at 1/5/2019 0519  Gross per 24 hour   Intake 2532.08 ml   Output --   Net 2532.08 ml      Physical Exam   Constitutional: She is oriented to person, place, and time. She appears well-developed and well-nourished.   Acutely ill appearing elderly female in moderate distress with abdominal pain   HENT:   Head: Normocephalic.   Eyes: Conjunctivae are normal. Right eye exhibits no discharge. Left eye exhibits no discharge.   Neck: Normal range of motion. Neck supple.   Cardiovascular: Regular rhythm, normal heart sounds and intact distal pulses. Tachycardia present.   Pulses:       Radial pulses are 1+ on the right side, and 1+ on the left side.   Pulmonary/Chest: Effort normal and breath sounds normal. No respiratory distress.   Abdominal: Soft. Bowel sounds are normal. She exhibits distension. There is generalized tenderness.   Musculoskeletal: Normal range of motion.   Neurological: She is alert and oriented to person, place, and time. She has normal strength. GCS eye subscore is 4. GCS verbal subscore is 5. GCS motor subscore is 6.   Skin: Skin is warm and dry.   Psychiatric: She has a normal mood and affect. Her speech is normal and behavior is normal. Judgment and thought content normal.       Significant Labs:   CBC:   Recent Labs   Lab  01/03/19  1628 01/04/19  0530 01/05/19  0453   WBC 18.69* 15.17*  15.17* 11.13   HGB 14.2 12.1  12.1 11.9*   HCT 42.8 37.2  37.2 36.6*    222  222 226     CMP:   Recent Labs   Lab 01/03/19  1628 01/04/19  0529 01/05/19  0453    138  138 141   K 3.8 3.7  3.7 3.1*    109  109 110   CO2 24 23  23 23   * 94  94 95   BUN 19 14  14 9   CREATININE 0.9 0.9  0.9 0.8   CALCIUM 9.6 8.4*  8.4* 8.4*   PROT 7.1 5.5*  5.5* 5.3*   ALBUMIN 4.0 2.9*  2.9* 2.6*   BILITOT 2.9* 1.7*  1.7* 1.3*   ALKPHOS 99 86  86 83   AST 21 16  16 14   ALT 20 15  15 13   ANIONGAP 10 6*  6* 8   EGFRNONAA >60 >60  >60 >60     Magnesium:   Recent Labs   Lab 01/04/19  0529 01/05/19  0453   MG 2.0 2.0       Significant Imaging: I have reviewed and interpreted all pertinent imaging results/findings within the past 24 hours.

## 2019-01-05 NOTE — ASSESSMENT & PLAN NOTE
Inflammatory changes in the cecum  IV Cipro/Flagyl to continue  Advance diet as tolerated  Adjust pain medication

## 2019-01-05 NOTE — PROGRESS NOTES
Gastroenterology Progress Note    Active Hospital Problems    *Acute colitis      Essential hypertension      GERD (gastroesophageal reflux disease)        Subjective:  Patient seen and examined.  The patient is stable this AM, continued complaints of RLQ abdominal discomfort.  She is passing gas, no stool.  Afebrile.  WBC improving.    Physical Exam    Vitals:  Vitals:    01/05/19 1000   BP:    Pulse: 93   Resp:    Temp:        Constitutional: awake, nad  HEENT: anicteric sclera  Cardiovascular: Normal rate, normal rhythm, no murmurs appreciated  Respiratory: Equal BS bilaterally without distress  GI:  Soft, TTP in the RLQ, bowel sounds present, minimal guarding  Musculoskeletal:  No edema    Medications/Infusions:  Current Facility-Administered Medications   Medication Dose Route Frequency Provider Last Rate Last Dose    0/9% NACL & POTASSIUM CHLORIDE 20 MEQ/L 1,000 mL infusion   Intravenous Continuous Maria Del Carmen Wild MD        acetaminophen tablet 650 mg  650 mg Oral Q4H PRN Aquilino Ludwig NP        apixaban tablet 5 mg  5 mg Oral BID Aquilino Ludwig NP   5 mg at 01/05/19 0842    ciprofloxacin (CIPRO)400mg/200ml D5W IVPB 400 mg  400 mg Intravenous Q12H Aquilino Ludwig  mL/hr at 01/05/19 0842 400 mg at 01/05/19 0842    famotidine tablet 20 mg  20 mg Oral BID Aquilino Ludwig NP   20 mg at 01/05/19 0842    losartan tablet 100 mg  100 mg Oral Daily Aquilino Ludwig NP   100 mg at 01/05/19 0842    metronidazole IVPB 500 mg  500 mg Intravenous Q8H Aquilino Ludwig  mL/hr at 01/05/19 0504 500 mg at 01/05/19 0504    morphine injection 4 mg  4 mg Intravenous Q4H PRN Aquilino Ludwig NP   4 mg at 01/05/19 0849    NIFEdipine 24 hr tablet 30 mg  30 mg Oral Daily Aquilino Ludwig NP   30 mg at 01/05/19 0842    ondansetron disintegrating tablet 8 mg  8 mg Oral Q8H PRN Aquilino Ludwig NP        oxybutynin 24 hr tablet 10 mg  10 mg Oral Daily Aquilino Ludwig NP   10 mg  at 01/05/19 0842    pantoprazole EC tablet 40 mg  40 mg Oral Daily Aquilino Ludwig NP   40 mg at 01/05/19 0842    predniSONE tablet 1 mg  1 mg Oral Daily Aquilino Ludwig NP   1 mg at 01/05/19 0842    sodium chloride 0.9% flush 5 mL  5 mL Intravenous PRN Aquilino Ludwig NP           Intake and Output:    Intake/Output Summary (Last 24 hours) at 1/5/2019 1136  Last data filed at 1/5/2019 0519  Gross per 24 hour   Intake 2532.08 ml   Output --   Net 2532.08 ml       Labs:    Results for GIRISH STOUT (MRN [id number]) as of 1/5/2019 11:36   Ref. Range 1/5/2019 04:53   WBC Latest Ref Range: 3.90 - 12.70 K/uL 11.13   RBC Latest Ref Range: 4.00 - 5.40 M/uL 3.89 (L)   Hemoglobin Latest Ref Range: 12.0 - 16.0 g/dL 11.9 (L)   Hematocrit Latest Ref Range: 37.0 - 48.5 % 36.6 (L)   MCV Latest Ref Range: 82 - 98 fL 94   MCH Latest Ref Range: 27.0 - 31.0 pg 30.6   MCHC Latest Ref Range: 32.0 - 36.0 g/dL 32.5   RDW Latest Ref Range: 11.5 - 14.5 % 14.9 (H)   Platelets Latest Ref Range: 150 - 350 K/uL 226   MPV Latest Ref Range: 9.2 - 12.9 fL 11.1   Gran% Latest Ref Range: 38.0 - 73.0 % 76.5 (H)   Gran # (ANC) Latest Ref Range: 1.8 - 7.7 K/uL 8.5 (H)   Lymph% Latest Ref Range: 18.0 - 48.0 % 12.5 (L)   Lymph # Latest Ref Range: 1.0 - 4.8 K/uL 1.4   Mono% Latest Ref Range: 4.0 - 15.0 % 8.9   Mono # Latest Ref Range: 0.3 - 1.0 K/uL 1.0   Eosinophil% Latest Ref Range: 0.0 - 8.0 % 1.5   Eos # Latest Ref Range: 0.0 - 0.5 K/uL 0.2   Basophil% Latest Ref Range: 0.0 - 1.9 % 0.3   Baso # Latest Ref Range: 0.00 - 0.20 K/uL 0.03   Differential Method Unknown Automated   Sodium Latest Ref Range: 136 - 145 mmol/L 141   Potassium Latest Ref Range: 3.5 - 5.1 mmol/L 3.1 (L)   Chloride Latest Ref Range: 95 - 110 mmol/L 110   CO2 Latest Ref Range: 23 - 29 mmol/L 23   Anion Gap Latest Ref Range: 8 - 16 mmol/L 8   BUN, Bld Latest Ref Range: 8 - 23 mg/dL 9   Creatinine Latest Ref Range: 0.5 - 1.4 mg/dL 0.8   eGFR if non African American  Latest Ref Range: >60 mL/min/1.73 m^2 >60   eGFR if  Latest Ref Range: >60 mL/min/1.73 m^2 >60   Glucose Latest Ref Range: 70 - 110 mg/dL 95   Calcium Latest Ref Range: 8.7 - 10.5 mg/dL 8.4 (L)   Phosphorus Latest Ref Range: 2.7 - 4.5 mg/dL 3.0   Magnesium Latest Ref Range: 1.6 - 2.6 mg/dL 2.0   Alkaline Phosphatase Latest Ref Range: 55 - 135 U/L 83   Total Protein Latest Ref Range: 6.0 - 8.4 g/dL 5.3 (L)   Albumin Latest Ref Range: 3.5 - 5.2 g/dL 2.6 (L)   Total Bilirubin Latest Ref Range: 0.1 - 1.0 mg/dL 1.3 (H)   AST Latest Ref Range: 10 - 40 U/L 14   ALT Latest Ref Range: 10 - 44 U/L 13       Imaging and other studies:    No new    Assessment:    1.  Cecal colitis  2.  RLQ abdominal pain  3.  Resolving leukocytosis    Plan:    1.  Clear liquid diet  2.  Low threshold for repeat CT imaging should pain worsen, plus surgical consultation  3.  Encourage OOB to chair and ambulation  4.  Serial abdominal exams    Case discussed with patient, , daughter-Lakshmi, and Dr. Wild.  Will follow closely.

## 2019-01-05 NOTE — PLAN OF CARE
Problem: Adult Inpatient Plan of Care  Goal: Plan of Care Review  Outcome: Ongoing (interventions implemented as appropriate)  Patient has a dx of Colitis. AAOx4, on RA, VSS, afebrile. Scheduled antibiotics given. Pain managed effectively with prn Morphine 4mg IVP x 1. Up ad shaji to the bathroom.

## 2019-01-06 LAB
ALBUMIN SERPL BCP-MCNC: 2.5 G/DL
ALP SERPL-CCNC: 72 U/L
ALT SERPL W/O P-5'-P-CCNC: 8 U/L
ANION GAP SERPL CALC-SCNC: 8 MMOL/L
AST SERPL-CCNC: 12 U/L
BASOPHILS # BLD AUTO: 0.04 K/UL
BASOPHILS NFR BLD: 0.4 %
BILIRUB SERPL-MCNC: 1.2 MG/DL
BUN SERPL-MCNC: 8 MG/DL
CALCIUM SERPL-MCNC: 8.7 MG/DL
CHLORIDE SERPL-SCNC: 111 MMOL/L
CO2 SERPL-SCNC: 21 MMOL/L
CREAT SERPL-MCNC: 0.8 MG/DL
DIFFERENTIAL METHOD: ABNORMAL
EOSINOPHIL # BLD AUTO: 0.2 K/UL
EOSINOPHIL NFR BLD: 2 %
ERYTHROCYTE [DISTWIDTH] IN BLOOD BY AUTOMATED COUNT: 14.5 %
EST. GFR  (AFRICAN AMERICAN): >60 ML/MIN/1.73 M^2
EST. GFR  (NON AFRICAN AMERICAN): >60 ML/MIN/1.73 M^2
GLUCOSE SERPL-MCNC: 93 MG/DL
HCT VFR BLD AUTO: 39.6 %
HGB BLD-MCNC: 13.1 G/DL
LYMPHOCYTES # BLD AUTO: 1.2 K/UL
LYMPHOCYTES NFR BLD: 13 %
MAGNESIUM SERPL-MCNC: 1.9 MG/DL
MCH RBC QN AUTO: 30.6 PG
MCHC RBC AUTO-ENTMCNC: 33.1 G/DL
MCV RBC AUTO: 93 FL
MONOCYTES # BLD AUTO: 0.8 K/UL
MONOCYTES NFR BLD: 9 %
NEUTROPHILS # BLD AUTO: 7 K/UL
NEUTROPHILS NFR BLD: 75.4 %
PHOSPHATE SERPL-MCNC: 3.3 MG/DL
PLATELET # BLD AUTO: 256 K/UL
PMV BLD AUTO: 10.6 FL
POTASSIUM SERPL-SCNC: 3.8 MMOL/L
PROT SERPL-MCNC: 5.4 G/DL
RBC # BLD AUTO: 4.28 M/UL
SODIUM SERPL-SCNC: 140 MMOL/L
WBC # BLD AUTO: 9.34 K/UL

## 2019-01-06 PROCEDURE — 63600175 PHARM REV CODE 636 W HCPCS: Performed by: NURSE PRACTITIONER

## 2019-01-06 PROCEDURE — 84100 ASSAY OF PHOSPHORUS: CPT

## 2019-01-06 PROCEDURE — 80053 COMPREHEN METABOLIC PANEL: CPT

## 2019-01-06 PROCEDURE — 94761 N-INVAS EAR/PLS OXIMETRY MLT: CPT

## 2019-01-06 PROCEDURE — 36415 COLL VENOUS BLD VENIPUNCTURE: CPT

## 2019-01-06 PROCEDURE — 99232 PR SUBSEQUENT HOSPITAL CARE,LEVL II: ICD-10-PCS | Mod: ,,, | Performed by: HOSPITALIST

## 2019-01-06 PROCEDURE — 83735 ASSAY OF MAGNESIUM: CPT

## 2019-01-06 PROCEDURE — 99232 SBSQ HOSP IP/OBS MODERATE 35: CPT | Mod: ,,, | Performed by: HOSPITALIST

## 2019-01-06 PROCEDURE — 63600175 PHARM REV CODE 636 W HCPCS: Performed by: HOSPITALIST

## 2019-01-06 PROCEDURE — 25000003 PHARM REV CODE 250: Performed by: HOSPITALIST

## 2019-01-06 PROCEDURE — 11000001 HC ACUTE MED/SURG PRIVATE ROOM

## 2019-01-06 PROCEDURE — 25000003 PHARM REV CODE 250: Performed by: NURSE PRACTITIONER

## 2019-01-06 PROCEDURE — S0030 INJECTION, METRONIDAZOLE: HCPCS | Performed by: NURSE PRACTITIONER

## 2019-01-06 PROCEDURE — 85025 COMPLETE CBC W/AUTO DIFF WBC: CPT

## 2019-01-06 RX ORDER — MORPHINE SULFATE 2 MG/ML
2 INJECTION, SOLUTION INTRAMUSCULAR; INTRAVENOUS ONCE
Status: COMPLETED | OUTPATIENT
Start: 2019-01-06 | End: 2019-01-06

## 2019-01-06 RX ADMIN — PREDNISONE 1 MG: 1 TABLET ORAL at 08:01

## 2019-01-06 RX ADMIN — APIXABAN 5 MG: 2.5 TABLET, FILM COATED ORAL at 08:01

## 2019-01-06 RX ADMIN — METRONIDAZOLE 500 MG: 500 INJECTION, SOLUTION INTRAVENOUS at 02:01

## 2019-01-06 RX ADMIN — METRONIDAZOLE 500 MG: 500 INJECTION, SOLUTION INTRAVENOUS at 09:01

## 2019-01-06 RX ADMIN — CIPROFLOXACIN 400 MG: 2 INJECTION, SOLUTION INTRAVENOUS at 08:01

## 2019-01-06 RX ADMIN — SODIUM CHLORIDE AND POTASSIUM CHLORIDE: .9; .15 SOLUTION INTRAVENOUS at 03:01

## 2019-01-06 RX ADMIN — OXYBUTYNIN CHLORIDE 10 MG: 5 TABLET, EXTENDED RELEASE ORAL at 08:01

## 2019-01-06 RX ADMIN — APIXABAN 5 MG: 2.5 TABLET, FILM COATED ORAL at 10:01

## 2019-01-06 RX ADMIN — NIFEDIPINE 30 MG: 30 TABLET, FILM COATED, EXTENDED RELEASE ORAL at 08:01

## 2019-01-06 RX ADMIN — MORPHINE SULFATE 2 MG: 2 INJECTION, SOLUTION INTRAMUSCULAR; INTRAVENOUS at 09:01

## 2019-01-06 RX ADMIN — PANTOPRAZOLE SODIUM 40 MG: 40 TABLET, DELAYED RELEASE ORAL at 08:01

## 2019-01-06 RX ADMIN — CIPROFLOXACIN 400 MG: 2 INJECTION, SOLUTION INTRAVENOUS at 09:01

## 2019-01-06 RX ADMIN — HYDROCODONE BITARTRATE AND ACETAMINOPHEN 1 TABLET: 7.5; 325 TABLET ORAL at 04:01

## 2019-01-06 RX ADMIN — FAMOTIDINE 20 MG: 20 TABLET ORAL at 10:01

## 2019-01-06 RX ADMIN — METRONIDAZOLE 500 MG: 500 INJECTION, SOLUTION INTRAVENOUS at 05:01

## 2019-01-06 RX ADMIN — FAMOTIDINE 20 MG: 20 TABLET ORAL at 08:01

## 2019-01-06 RX ADMIN — HYDROCODONE BITARTRATE AND ACETAMINOPHEN 1 TABLET: 7.5; 325 TABLET ORAL at 08:01

## 2019-01-06 RX ADMIN — LOSARTAN POTASSIUM 100 MG: 50 TABLET, FILM COATED ORAL at 08:01

## 2019-01-06 RX ADMIN — ONDANSETRON 8 MG: 8 TABLET, ORALLY DISINTEGRATING ORAL at 09:01

## 2019-01-06 NOTE — SUBJECTIVE & OBJECTIVE
Interval History: The patient still has some abdominal pain and an episode of emesis this am after eating.  Positive BM this am and flatus.    Review of Systems   Gastrointestinal: Positive for abdominal distention, abdominal pain, nausea and vomiting.     Objective:     Vital Signs (Most Recent):  Temp: 97.4 °F (36.3 °C) (01/06/19 1232)  Pulse: 108 (01/06/19 1232)  Resp: 18 (01/06/19 1232)  BP: 122/60 (01/06/19 1232)  SpO2: (!) 93 % (01/06/19 1232) Vital Signs (24h Range):  Temp:  [97.4 °F (36.3 °C)-98.5 °F (36.9 °C)] 97.4 °F (36.3 °C)  Pulse:  [104-110] 108  Resp:  [17-18] 18  SpO2:  [93 %-94 %] 93 %  BP: (106-122)/(55-60) 122/60     Weight: 68.1 kg (150 lb 2.1 oz)  Body mass index is 23.51 kg/m².    Intake/Output Summary (Last 24 hours) at 1/6/2019 1301  Last data filed at 1/6/2019 0600  Gross per 24 hour   Intake 3583.33 ml   Output --   Net 3583.33 ml      Physical Exam   Constitutional: She is oriented to person, place, and time. She appears well-developed and well-nourished.   Acutely ill appearing elderly female in moderate distress with abdominal pain   HENT:   Head: Normocephalic.   Eyes: Conjunctivae are normal. Right eye exhibits no discharge. Left eye exhibits no discharge.   Neck: Normal range of motion. Neck supple.   Cardiovascular: Regular rhythm, normal heart sounds and intact distal pulses. Tachycardia present.   Pulses:       Radial pulses are 1+ on the right side, and 1+ on the left side.   Pulmonary/Chest: Effort normal and breath sounds normal. No respiratory distress.   Abdominal: Soft. Bowel sounds are normal. She exhibits distension. There is generalized tenderness.   Worsening distension today   Musculoskeletal: Normal range of motion.   Neurological: She is alert and oriented to person, place, and time. She has normal strength. GCS eye subscore is 4. GCS verbal subscore is 5. GCS motor subscore is 6.   Skin: Skin is warm and dry.   Psychiatric: She has a normal mood and affect. Her speech  is normal and behavior is normal. Judgment and thought content normal.       Significant Labs:   CBC:   Recent Labs   Lab 01/05/19 0453 01/06/19 0527   WBC 11.13 9.34   HGB 11.9* 13.1   HCT 36.6* 39.6    256     CMP:   Recent Labs   Lab 01/05/19 0453 01/06/19 0527    140   K 3.1* 3.8    111*   CO2 23 21*   GLU 95 93   BUN 9 8   CREATININE 0.8 0.8   CALCIUM 8.4* 8.7   PROT 5.3* 5.4*   ALBUMIN 2.6* 2.5*   BILITOT 1.3* 1.2*   ALKPHOS 83 72   AST 14 12   ALT 13 8*   ANIONGAP 8 8   EGFRNONAA >60 >60     Magnesium:   Recent Labs   Lab 01/05/19 0453 01/06/19 0527   MG 2.0 1.9       Significant Imaging: I have reviewed and interpreted all pertinent imaging results/findings within the past 24 hours.

## 2019-01-06 NOTE — PLAN OF CARE
Problem: Adult Inpatient Plan of Care  Goal: Plan of Care Review  Outcome: Ongoing (interventions implemented as appropriate)  No significant events this shift. Free from falls, new skin breakdown or injury. Resp even and unlabored on room air. AAOx4. Tolerating clear liquids, no nausea or vomiting reported. Pain changed from iv to po today, tolerating well. Ambulating in halls. Denies needs.  and daughters at bedside throughout the shift. Bed in lowest locked position, side rails elevated, cb in reach. Purposeful rounding done every hour.

## 2019-01-06 NOTE — PROGRESS NOTES
[facility] Hospital Medicine  Progress Note    Patient Name: Lakshmi Juarez  MRN: [id number]  Patient Class: IP- Inpatient   Admission Date: 1/3/2019  Length of Stay: 3 days  Attending Physician: Maria Del Carmen Wild MD  Primary Care Provider: Bisi Rey MD        Subjective:     Principal Problem:Acute colitis    HPI:  The patient is a 83 y.o. female with hx of HTN and colon CA who presents with complaint of abdominal pain since yesterday evening. She states it started to the back, but moved to the mid abdomen. There is associated nausea, diarrhea, and abdominal distension. She had two episodes of diarrhea. Last bowel movement was about 4 hours ago. She also reports lack of appetite. Pt is able to pass gas. Pt was seen at urgent care and abdominal x-ray at that time revealed probably bowel obstruction. Pt has PShx of total abdominal hysterectomy with bilateral salpingoophorectomy, partial colon resection, and cardiac valve replacement. She may have had an appendectomy, but is unsure. She denies any fever, chills, vomiting, chest pain, SOB, lightheadedness, and weakness. No urinary sx.        Hospital Course:  CT of the abdomen/pelvis was done that revealed marked bowel wall thickening of the cecum with associated fat stranding.  The patient was started IV Cipro and Flagyl.  GI was consulted and based on colonoscopy that was done in 2016, does not recommend endoscopy at this time.       Interval History: The patient still has some abdominal pain and an episode of emesis this am after eating.  Positive BM this am and flatus.    Review of Systems   Gastrointestinal: Positive for abdominal distention, abdominal pain, nausea and vomiting.     Objective:     Vital Signs (Most Recent):  Temp: 97.4 °F (36.3 °C) (01/06/19 1232)  Pulse: 108 (01/06/19 1232)  Resp: 18 (01/06/19 1232)  BP: 122/60 (01/06/19 1232)  SpO2: (!) 93 % (01/06/19 1232) Vital Signs (24h Range):  Temp:  [97.4 °F (36.3 °C)-98.5 °F  (36.9 °C)] 97.4 °F (36.3 °C)  Pulse:  [104-110] 108  Resp:  [17-18] 18  SpO2:  [93 %-94 %] 93 %  BP: (106-122)/(55-60) 122/60     Weight: 68.1 kg (150 lb 2.1 oz)  Body mass index is 23.51 kg/m².    Intake/Output Summary (Last 24 hours) at 1/6/2019 1301  Last data filed at 1/6/2019 0600  Gross per 24 hour   Intake 3583.33 ml   Output --   Net 3583.33 ml      Physical Exam   Constitutional: She is oriented to person, place, and time. She appears well-developed and well-nourished.   Acutely ill appearing elderly female in moderate distress with abdominal pain   HENT:   Head: Normocephalic.   Eyes: Conjunctivae are normal. Right eye exhibits no discharge. Left eye exhibits no discharge.   Neck: Normal range of motion. Neck supple.   Cardiovascular: Regular rhythm, normal heart sounds and intact distal pulses. Tachycardia present.   Pulses:       Radial pulses are 1+ on the right side, and 1+ on the left side.   Pulmonary/Chest: Effort normal and breath sounds normal. No respiratory distress.   Abdominal: Soft. Bowel sounds are normal. She exhibits distension. There is generalized tenderness.   Worsening distension today   Musculoskeletal: Normal range of motion.   Neurological: She is alert and oriented to person, place, and time. She has normal strength. GCS eye subscore is 4. GCS verbal subscore is 5. GCS motor subscore is 6.   Skin: Skin is warm and dry.   Psychiatric: She has a normal mood and affect. Her speech is normal and behavior is normal. Judgment and thought content normal.       Significant Labs:   CBC:   Recent Labs   Lab 01/05/19 0453 01/06/19 0527   WBC 11.13 9.34   HGB 11.9* 13.1   HCT 36.6* 39.6    256     CMP:   Recent Labs   Lab 01/05/19 0453 01/06/19 0527    140   K 3.1* 3.8    111*   CO2 23 21*   GLU 95 93   BUN 9 8   CREATININE 0.8 0.8   CALCIUM 8.4* 8.7   PROT 5.3* 5.4*   ALBUMIN 2.6* 2.5*   BILITOT 1.3* 1.2*   ALKPHOS 83 72   AST 14 12   ALT 13 8*   ANIONGAP 8 8    EGFRNONAA >60 >60     Magnesium:   Recent Labs   Lab 01/05/19  0453 01/06/19  0527   MG 2.0 1.9       Significant Imaging: I have reviewed and interpreted all pertinent imaging results/findings within the past 24 hours.    Assessment/Plan:      * Acute colitis    Inflammatory changes in the cecum  IV Cipro/Flagyl to continue  Advance diet as tolerated per GI  Obtained flat and erect abdomen that shows worsening bowel distension  Consult General Surgery for additional recommendations     GERD (gastroesophageal reflux disease)    Will continue PPI       Essential hypertension    Normotensive currently  Continue losartan, nifedipine         VTE Risk Mitigation (From admission, onward)        Ordered     apixaban tablet 5 mg  2 times daily      01/03/19 2228     IP VTE HIGH RISK PATIENT  Once      01/03/19 2228     Reason for No Pharmacological VTE Prophylaxis  Once      01/03/19 2228     Place JOCELIN hose  Until discontinued      01/03/19 2228     Place sequential compression device  Until discontinued      01/03/19 2228     Place sequential compression device  Until discontinued      01/03/19 2059     Place JOCELIN hose  Until discontinued      01/03/19 2053              Maria Del Carmen Wild MD  Department of Hospital Medicine   [facility]

## 2019-01-06 NOTE — PROGRESS NOTES
Gastroenterology Progress Note    Active Hospital Problems    *Acute colitis      Essential hypertension      GERD (gastroesophageal reflux disease)        Subjective:  Patient seen and examined.  The patient continues to improve clinically.  Passing gas, small explosive stool this AM.  Continues to ambulate.    Physical Exam    Vitals:  Vitals:    01/06/19 0720   BP: (!) 115/56   Pulse: 110   Resp: 18   Temp: 98.2 °F (36.8 °C)       Constitutional: awake, nad  HEENT: anicteric sclera  Cardiovascular: Normal rate, normal rhythm, no murmurs appreciated  Respiratory: Equal BS bilaterally without distress  GI:  Soft, TTP in the RLQ, no rebound or guarding, bowel sounds present  Musculoskeletal:  No edema    Medications/Infusions:  Current Facility-Administered Medications   Medication Dose Route Frequency Provider Last Rate Last Dose    0/9% NACL & POTASSIUM CHLORIDE 20 MEQ/L 1,000 mL infusion   Intravenous Continuous Maria Del Carmen Wild  mL/hr at 01/06/19 0331      acetaminophen tablet 650 mg  650 mg Oral Q4H PRN Aquilino Ludwig NP        apixaban tablet 5 mg  5 mg Oral BID Aquilino Ludwig NP   5 mg at 01/06/19 0821    ciprofloxacin (CIPRO)400mg/200ml D5W IVPB 400 mg  400 mg Intravenous Q12H Aquilino Ludwig  mL/hr at 01/06/19 0822 400 mg at 01/06/19 0822    famotidine tablet 20 mg  20 mg Oral BID Aquilino Ludwig NP   20 mg at 01/06/19 0821    HYDROcodone-acetaminophen 7.5-325 mg per tablet 1 tablet  1 tablet Oral Q6H PRN Maria Del Carmen Wild MD   1 tablet at 01/06/19 0822    losartan tablet 100 mg  100 mg Oral Daily Aquilino Ludwig NP   100 mg at 01/06/19 0821    metronidazole IVPB 500 mg  500 mg Intravenous Q8H Aquilino Ludwig  mL/hr at 01/06/19 0525 500 mg at 01/06/19 0525    NIFEdipine 24 hr tablet 30 mg  30 mg Oral Daily Aquilino Ludwig NP   30 mg at 01/06/19 0821    ondansetron disintegrating tablet 8 mg  8 mg Oral Q8H PRN Aquilino Ludwig NP   8 mg at 01/06/19  0950    oxybutynin 24 hr tablet 10 mg  10 mg Oral Daily Aquilino Ludwig NP   10 mg at 01/06/19 0821    pantoprazole EC tablet 40 mg  40 mg Oral Daily Aquilino Ludwig NP   40 mg at 01/06/19 0821    predniSONE tablet 1 mg  1 mg Oral Daily Aquilino Ludwig NP   1 mg at 01/05/19 0842    sodium chloride 0.9% flush 5 mL  5 mL Intravenous PRN Aquilino Ludwig NP        traMADol tablet 50 mg  50 mg Oral Q4H PRN Maria Del Carmen Wild MD           Intake and Output:    Intake/Output Summary (Last 24 hours) at 1/6/2019 0951  Last data filed at 1/6/2019 0600  Gross per 24 hour   Intake 3583.33 ml   Output --   Net 3583.33 ml       Labs:    Results for GIRISH STOUT (MRN [id number]) as of 1/6/2019 09:53   Ref. Range 1/6/2019 05:27   WBC Latest Ref Range: 3.90 - 12.70 K/uL 9.34   RBC Latest Ref Range: 4.00 - 5.40 M/uL 4.28   Hemoglobin Latest Ref Range: 12.0 - 16.0 g/dL 13.1   Hematocrit Latest Ref Range: 37.0 - 48.5 % 39.6   MCV Latest Ref Range: 82 - 98 fL 93   MCH Latest Ref Range: 27.0 - 31.0 pg 30.6   MCHC Latest Ref Range: 32.0 - 36.0 g/dL 33.1   RDW Latest Ref Range: 11.5 - 14.5 % 14.5   Platelets Latest Ref Range: 150 - 350 K/uL 256   MPV Latest Ref Range: 9.2 - 12.9 fL 10.6   Gran% Latest Ref Range: 38.0 - 73.0 % 75.4 (H)   Gran # (ANC) Latest Ref Range: 1.8 - 7.7 K/uL 7.0   Lymph% Latest Ref Range: 18.0 - 48.0 % 13.0 (L)   Lymph # Latest Ref Range: 1.0 - 4.8 K/uL 1.2   Mono% Latest Ref Range: 4.0 - 15.0 % 9.0   Mono # Latest Ref Range: 0.3 - 1.0 K/uL 0.8   Eosinophil% Latest Ref Range: 0.0 - 8.0 % 2.0   Eos # Latest Ref Range: 0.0 - 0.5 K/uL 0.2   Basophil% Latest Ref Range: 0.0 - 1.9 % 0.4   Baso # Latest Ref Range: 0.00 - 0.20 K/uL 0.04   Differential Method Unknown Automated   Sodium Latest Ref Range: 136 - 145 mmol/L 140   Potassium Latest Ref Range: 3.5 - 5.1 mmol/L 3.8   Chloride Latest Ref Range: 95 - 110 mmol/L 111 (H)   CO2 Latest Ref Range: 23 - 29 mmol/L 21 (L)   Anion Gap Latest Ref Range: 8 - 16  mmol/L 8   BUN, Bld Latest Ref Range: 8 - 23 mg/dL 8   Creatinine Latest Ref Range: 0.5 - 1.4 mg/dL 0.8   eGFR if non African American Latest Ref Range: >60 mL/min/1.73 m^2 >60   eGFR if  Latest Ref Range: >60 mL/min/1.73 m^2 >60   Glucose Latest Ref Range: 70 - 110 mg/dL 93   Calcium Latest Ref Range: 8.7 - 10.5 mg/dL 8.7   Phosphorus Latest Ref Range: 2.7 - 4.5 mg/dL 3.3   Magnesium Latest Ref Range: 1.6 - 2.6 mg/dL 1.9   Alkaline Phosphatase Latest Ref Range: 55 - 135 U/L 72   Total Protein Latest Ref Range: 6.0 - 8.4 g/dL 5.4 (L)   Albumin Latest Ref Range: 3.5 - 5.2 g/dL 2.5 (L)   Total Bilirubin Latest Ref Range: 0.1 - 1.0 mg/dL 1.2 (H)   AST Latest Ref Range: 10 - 40 U/L 12   ALT Latest Ref Range: 10 - 44 U/L 8 (L)       Imaging and other studies:    No new    Assessment:  1.  Cecal colitis  2.  RLQ abdominal pain  3.  Resolving leukocytosis    Plan:    1.  Full liquid diet with slow advancement to low residue as tolerated  2.  Low threshold for repeat CT imaging should pain worsen, plus surgical consultation.  Doubt need given clinical improvement  3.  Encourage OOB to chair and ambulation  4.  Serial abdominal exams     Case discussed with patient, daughter - Guadalupe, and Dr. Wild.  Will follow closely.

## 2019-01-06 NOTE — PLAN OF CARE
Problem: Adult Inpatient Plan of Care  Goal: Plan of Care Review  Outcome: Ongoing (interventions implemented as appropriate)  Patient has a dx of Colitis. AAOx4, on RA, VSS, afebrile. Scheduled antibiotics given. No c/o pain or n/v. Up ad shaji around in the halls during this shift.

## 2019-01-06 NOTE — ASSESSMENT & PLAN NOTE
Inflammatory changes in the cecum  IV Cipro/Flagyl to continue  Advance diet as tolerated per GI  Obtained flat and erect abdomen that shows worsening bowel distension  Consult General Surgery for additional recommendations

## 2019-01-07 PROBLEM — E87.6 HYPOKALEMIA: Status: ACTIVE | Noted: 2019-01-07

## 2019-01-07 PROBLEM — R04.0 EPISTAXIS: Status: ACTIVE | Noted: 2019-01-07

## 2019-01-07 LAB
ALBUMIN SERPL BCP-MCNC: 3 G/DL
ALP SERPL-CCNC: 78 U/L
ALT SERPL W/O P-5'-P-CCNC: 12 U/L
ANION GAP SERPL CALC-SCNC: 10 MMOL/L
AST SERPL-CCNC: 18 U/L
BASOPHILS # BLD AUTO: 0.05 K/UL
BASOPHILS NFR BLD: 0.6 %
BILIRUB SERPL-MCNC: 1.5 MG/DL
BUN SERPL-MCNC: 7 MG/DL
CALCIUM SERPL-MCNC: 8.6 MG/DL
CHLORIDE SERPL-SCNC: 110 MMOL/L
CO2 SERPL-SCNC: 20 MMOL/L
CREAT SERPL-MCNC: 0.7 MG/DL
DIFFERENTIAL METHOD: ABNORMAL
EOSINOPHIL # BLD AUTO: 0.2 K/UL
EOSINOPHIL NFR BLD: 2.4 %
ERYTHROCYTE [DISTWIDTH] IN BLOOD BY AUTOMATED COUNT: 14.4 %
EST. GFR  (AFRICAN AMERICAN): >60 ML/MIN/1.73 M^2
EST. GFR  (NON AFRICAN AMERICAN): >60 ML/MIN/1.73 M^2
GLUCOSE SERPL-MCNC: 103 MG/DL
HCT VFR BLD AUTO: 40.5 %
HGB BLD-MCNC: 13.4 G/DL
LYMPHOCYTES # BLD AUTO: 1.2 K/UL
LYMPHOCYTES NFR BLD: 13.4 %
MAGNESIUM SERPL-MCNC: 1.9 MG/DL
MCH RBC QN AUTO: 30.3 PG
MCHC RBC AUTO-ENTMCNC: 33.1 G/DL
MCV RBC AUTO: 92 FL
MONOCYTES # BLD AUTO: 1 K/UL
MONOCYTES NFR BLD: 11.2 %
NEUTROPHILS # BLD AUTO: 6.2 K/UL
NEUTROPHILS NFR BLD: 72.1 %
PHOSPHATE SERPL-MCNC: 3 MG/DL
PLATELET # BLD AUTO: 274 K/UL
PMV BLD AUTO: 10.4 FL
POTASSIUM SERPL-SCNC: 3.3 MMOL/L
PROT SERPL-MCNC: 5.8 G/DL
RBC # BLD AUTO: 4.42 M/UL
SODIUM SERPL-SCNC: 140 MMOL/L
WBC # BLD AUTO: 8.65 K/UL

## 2019-01-07 PROCEDURE — 85025 COMPLETE CBC W/AUTO DIFF WBC: CPT

## 2019-01-07 PROCEDURE — 63600175 PHARM REV CODE 636 W HCPCS: Performed by: NURSE PRACTITIONER

## 2019-01-07 PROCEDURE — 99233 PR SUBSEQUENT HOSPITAL CARE,LEVL III: ICD-10-PCS | Mod: ,,, | Performed by: HOSPITALIST

## 2019-01-07 PROCEDURE — 84100 ASSAY OF PHOSPHORUS: CPT

## 2019-01-07 PROCEDURE — 94761 N-INVAS EAR/PLS OXIMETRY MLT: CPT

## 2019-01-07 PROCEDURE — 11000001 HC ACUTE MED/SURG PRIVATE ROOM

## 2019-01-07 PROCEDURE — 63600175 PHARM REV CODE 636 W HCPCS: Performed by: HOSPITALIST

## 2019-01-07 PROCEDURE — 25000003 PHARM REV CODE 250: Performed by: NURSE PRACTITIONER

## 2019-01-07 PROCEDURE — 83735 ASSAY OF MAGNESIUM: CPT

## 2019-01-07 PROCEDURE — 36415 COLL VENOUS BLD VENIPUNCTURE: CPT

## 2019-01-07 PROCEDURE — 99233 SBSQ HOSP IP/OBS HIGH 50: CPT | Mod: ,,, | Performed by: HOSPITALIST

## 2019-01-07 PROCEDURE — S0030 INJECTION, METRONIDAZOLE: HCPCS | Performed by: NURSE PRACTITIONER

## 2019-01-07 PROCEDURE — 25000003 PHARM REV CODE 250: Performed by: HOSPITALIST

## 2019-01-07 PROCEDURE — 80053 COMPREHEN METABOLIC PANEL: CPT

## 2019-01-07 RX ADMIN — BENZOCAINE: 200 SPRAY DENTAL; ORAL; PERIODONTAL at 10:01

## 2019-01-07 RX ADMIN — ONDANSETRON 8 MG: 8 TABLET, ORALLY DISINTEGRATING ORAL at 10:01

## 2019-01-07 RX ADMIN — TRAMADOL HYDROCHLORIDE 50 MG: 50 TABLET, FILM COATED ORAL at 09:01

## 2019-01-07 RX ADMIN — OXYBUTYNIN CHLORIDE 10 MG: 5 TABLET, EXTENDED RELEASE ORAL at 10:01

## 2019-01-07 RX ADMIN — SODIUM CHLORIDE AND POTASSIUM CHLORIDE: .9; .15 SOLUTION INTRAVENOUS at 05:01

## 2019-01-07 RX ADMIN — FAMOTIDINE 20 MG: 20 TABLET ORAL at 09:01

## 2019-01-07 RX ADMIN — PANTOPRAZOLE SODIUM 40 MG: 40 TABLET, DELAYED RELEASE ORAL at 10:01

## 2019-01-07 RX ADMIN — TRAMADOL HYDROCHLORIDE 50 MG: 50 TABLET, FILM COATED ORAL at 01:01

## 2019-01-07 RX ADMIN — HYDROCODONE BITARTRATE AND ACETAMINOPHEN 1 TABLET: 7.5; 325 TABLET ORAL at 09:01

## 2019-01-07 RX ADMIN — SODIUM CHLORIDE AND POTASSIUM CHLORIDE: .9; .15 SOLUTION INTRAVENOUS at 10:01

## 2019-01-07 RX ADMIN — CIPROFLOXACIN 400 MG: 2 INJECTION, SOLUTION INTRAVENOUS at 10:01

## 2019-01-07 RX ADMIN — HYDROCODONE BITARTRATE AND ACETAMINOPHEN 1 TABLET: 7.5; 325 TABLET ORAL at 10:01

## 2019-01-07 RX ADMIN — APIXABAN 5 MG: 2.5 TABLET, FILM COATED ORAL at 10:01

## 2019-01-07 RX ADMIN — METRONIDAZOLE 500 MG: 500 INJECTION, SOLUTION INTRAVENOUS at 09:01

## 2019-01-07 RX ADMIN — FAMOTIDINE 20 MG: 20 TABLET ORAL at 10:01

## 2019-01-07 RX ADMIN — METRONIDAZOLE 500 MG: 500 INJECTION, SOLUTION INTRAVENOUS at 01:01

## 2019-01-07 RX ADMIN — PREDNISONE 1 MG: 1 TABLET ORAL at 10:01

## 2019-01-07 RX ADMIN — HYDROCODONE BITARTRATE AND ACETAMINOPHEN 1 TABLET: 7.5; 325 TABLET ORAL at 04:01

## 2019-01-07 RX ADMIN — APIXABAN 5 MG: 2.5 TABLET, FILM COATED ORAL at 09:01

## 2019-01-07 RX ADMIN — NIFEDIPINE 30 MG: 30 TABLET, FILM COATED, EXTENDED RELEASE ORAL at 10:01

## 2019-01-07 RX ADMIN — LOSARTAN POTASSIUM 100 MG: 50 TABLET, FILM COATED ORAL at 10:01

## 2019-01-07 RX ADMIN — SODIUM CHLORIDE AND POTASSIUM CHLORIDE: .9; .15 SOLUTION INTRAVENOUS at 03:01

## 2019-01-07 RX ADMIN — TRAMADOL HYDROCHLORIDE 50 MG: 50 TABLET, FILM COATED ORAL at 05:01

## 2019-01-07 RX ADMIN — METRONIDAZOLE 500 MG: 500 INJECTION, SOLUTION INTRAVENOUS at 04:01

## 2019-01-07 NOTE — NURSING
"Daughter at bedside. Daughter anxious pacing back and forth to nurses station expressing concern about her mothers"distended abdomen".    1930. Dr. Garcia  On unit. Order written for NGT.  Awaiting valve for NGT from ICU.    2100 NGT attempted to right nare and unable to tolerate. Bleeding small amount from nare. Patient then verbalized to nurse after removal  that she was admitted 6 weeks ago for a nose bleed to he right nare.    2115 #14 [place] NGT inserted to left nare without difficulty.patient wiping scant amounts of blood tinged mucous from tongue. Denies frequent swallowing.  Patients daughter then told me she notified DR. Garcia that her "mother was bleeding" Verbalized to daughter to please verb. Concerns to nurse to prevent confusion in care.   Aquilino Ro NP notified  For pain medication and ice pack applied to posterior neck per order.  2140. Xray done at bedside for NGT placement.  2200  Radiology notified nurse that placement was confirmed.    "

## 2019-01-07 NOTE — PROGRESS NOTES
Gastroenterology Progress Note    Active Hospital Problems    *Acute colitis      Essential hypertension      GERD (gastroesophageal reflux disease)        Subjective:  Patient seen and examined.  No abdominal pain. Perhaps less distended, no BM or flatus. The NG is very uncomfortable and there has been some epistaxis.    Physical Exam    Vitals:  Vitals:    01/07/19 0739   BP: 132/65   Pulse: (!) 113   Resp: 14   Temp: 97.8 °F (36.6 °C)       Constitutional: awake, uncomfortable  HEENT: anicteric sclera  Cardiovascular: Normal rate, normal rhythm, no murmurs appreciated  Respiratory: Equal BS bilaterally without distress  GI:  Soft, distended, no significant tenderness, no rebound or guarding, no BS  Musculoskeletal:  No edema    Medications/Infusions:  Current Facility-Administered Medications   Medication Dose Route Frequency Provider Last Rate Last Dose    0/9% NACL & POTASSIUM CHLORIDE 20 MEQ/L 1,000 mL infusion   Intravenous Continuous Maria Del Carmen Wild  mL/hr at 01/07/19 0348      acetaminophen tablet 650 mg  650 mg Oral Q4H PRN Aquilino Ludwig NP        apixaban tablet 5 mg  5 mg Oral BID Aquilino Ludwig NP   5 mg at 01/07/19 1020    benzocaine 20 % mouth spray   Mouth/Throat QID PRN Maria Del Carmen Wild MD        ciprofloxacin (CIPRO)400mg/200ml D5W IVPB 400 mg  400 mg Intravenous Q12H Aquilino Ludwig  mL/hr at 01/07/19 1018 400 mg at 01/07/19 1018    famotidine tablet 20 mg  20 mg Oral BID Aquilino Ludwig NP   20 mg at 01/07/19 1019    HYDROcodone-acetaminophen 7.5-325 mg per tablet 1 tablet  1 tablet Oral Q6H PRN Maria Del Carmen Wild MD   1 tablet at 01/07/19 1018    losartan tablet 100 mg  100 mg Oral Daily Aquilino Ludwig NP   100 mg at 01/07/19 1018    metronidazole IVPB 500 mg  500 mg Intravenous Q8H Aquilino Ludwig  mL/hr at 01/07/19 0456 500 mg at 01/07/19 0456    NIFEdipine 24 hr tablet 30 mg  30 mg Oral Daily Aquilino Ludwig NP   30 mg at 01/07/19 1019     ondansetron disintegrating tablet 8 mg  8 mg Oral Q8H PRN Aquilino Ludwig NP   8 mg at 01/06/19 0950    oxybutynin 24 hr tablet 10 mg  10 mg Oral Daily Aquilino Ludwig NP   10 mg at 01/07/19 1019    pantoprazole EC tablet 40 mg  40 mg Oral Daily Aquilino Ludwig NP   40 mg at 01/07/19 1018    predniSONE tablet 1 mg  1 mg Oral Daily Aquilino Ludwig NP   1 mg at 01/07/19 1020    sodium chloride 0.9% flush 5 mL  5 mL Intravenous PRN Aquilino Ludwig NP        traMADol tablet 50 mg  50 mg Oral Q4H PRN Maria Del Carmen Wild MD   50 mg at 01/07/19 0917       Intake and Output:    Intake/Output Summary (Last 24 hours) at 1/7/2019 1039  Last data filed at 1/7/2019 0500  Gross per 24 hour   Intake 1320 ml   Output 450 ml   Net 870 ml       Labs:  Recent Labs   Lab 01/07/19  0447   WBC 8.65   RBC 4.42   HGB 13.4   HCT 40.5      MCV 92   MCH 30.3   MCHC 33.1           Imaging and other studies:    No new    Assessment:  1.  Cecal colitis on CT  2.  SBO vs ileus      Plan:    1.  Continue NG suction  2.  Low threshold for repeat CT imaging should symptoms persist  3.  Surgery consulted.  4.  Serial abdominal exams     Case discussed with patient, daughter - Guadalupe, and Dr. Wild at bedside.

## 2019-01-07 NOTE — PROGRESS NOTES
Called by patient's daughter due to increasing abdominal distension and pain throughout the day.  Imaging ordered by Dr. Wild reviewed.  Exam with scattered high pitched bowel sounds, overall soft, minimal tenderness, mildly distended.      Plan:  -NG tube to LIWS once placement confirmed via imaging.  -Serial abdominal exams  -Re evaluate in the AM    Case discussed with on call hospitalist, Patrick Ludwig

## 2019-01-07 NOTE — SUBJECTIVE & OBJECTIVE
Interval History: The patient had some mild epistaxis with NG tube in place.  With tube clamped bowel sounds are absent.      Review of Systems  Objective:     Vital Signs (Most Recent):  Temp: 97.9 °F (36.6 °C) (01/07/19 1622)  Pulse: 110 (01/07/19 1622)  Resp: 18 (01/07/19 1622)  BP: (!) 142/71 (01/07/19 1622)  SpO2: 96 % (01/07/19 1622) Vital Signs (24h Range):  Temp:  [97.4 °F (36.3 °C)-98.3 °F (36.8 °C)] 97.9 °F (36.6 °C)  Pulse:  [103-113] 110  Resp:  [14-18] 18  SpO2:  [94 %-96 %] 96 %  BP: (129-148)/(60-79) 142/71     Weight: 68.1 kg (150 lb 2.1 oz)  Body mass index is 23.51 kg/m².    Intake/Output Summary (Last 24 hours) at 1/7/2019 1751  Last data filed at 1/7/2019 0500  Gross per 24 hour   Intake 1320 ml   Output 450 ml   Net 870 ml      Physical Exam   Constitutional: She is oriented to person, place, and time. She appears well-developed and well-nourished.   Acutely ill appearing elderly female in mild distress with abdominal discomfort this am   HENT:   Head: Normocephalic.   Eyes: Conjunctivae are normal. Right eye exhibits no discharge. Left eye exhibits no discharge.   Neck: Normal range of motion. Neck supple.   Cardiovascular: Regular rhythm, normal heart sounds and intact distal pulses. Tachycardia present.   Pulses:       Radial pulses are 1+ on the right side, and 1+ on the left side.   Pulmonary/Chest: Effort normal and breath sounds normal. No respiratory distress.   Abdominal: Soft. Bowel sounds are normal. She exhibits distension. There is generalized tenderness.   Worsening distension today   Musculoskeletal: Normal range of motion.   Neurological: She is alert and oriented to person, place, and time. She has normal strength. GCS eye subscore is 4. GCS verbal subscore is 5. GCS motor subscore is 6.   Skin: Skin is warm and dry.   Psychiatric: She has a normal mood and affect. Her speech is normal and behavior is normal. Judgment and thought content normal.       Significant Labs:   CBC:    Recent Labs   Lab 01/06/19 0527 01/07/19 0447   WBC 9.34 8.65   HGB 13.1 13.4   HCT 39.6 40.5    274     CMP:   Recent Labs   Lab 01/06/19 0527 01/07/19 0447    140   K 3.8 3.3*   * 110   CO2 21* 20*   GLU 93 103   BUN 8 7*   CREATININE 0.8 0.7   CALCIUM 8.7 8.6*   PROT 5.4* 5.8*   ALBUMIN 2.5* 3.0*   BILITOT 1.2* 1.5*   ALKPHOS 72 78   AST 12 18   ALT 8* 12   ANIONGAP 8 10   EGFRNONAA >60 >60     Magnesium:   Recent Labs   Lab 01/06/19 0527 01/07/19 0447   MG 1.9 1.9       Significant Imaging: I have reviewed and interpreted all pertinent imaging results/findings within the past 24 hours.

## 2019-01-07 NOTE — ASSESSMENT & PLAN NOTE
Inflammatory changes in the cecum  IV Cipro/Flagyl to continue  Advance diet as tolerated per GI  Obtained flat and erect abdomen that showed worsening bowel distension and NG tube placed for decompression with suspected ileus versus bowel obstruction  General Surgery for additional recommendations pending

## 2019-01-07 NOTE — PLAN OF CARE
Problem: Adult Inpatient Plan of Care  Goal: Plan of Care Review  Outcome: Ongoing (interventions implemented as appropriate)  Vs stable. Output from  less approx 120 cc ice chips . Contents initially orange tinged from Gatorade and light brown and decreasing after initial output. Requesting to have NGT removed and verb. MD will visit this AM. Medicated for c/o pain with relief. Abdomen soft and distended.

## 2019-01-08 LAB
ALBUMIN SERPL BCP-MCNC: 2.9 G/DL
ALP SERPL-CCNC: 73 U/L
ALT SERPL W/O P-5'-P-CCNC: 8 U/L
ANION GAP SERPL CALC-SCNC: 10 MMOL/L
AST SERPL-CCNC: 19 U/L
BASOPHILS # BLD AUTO: 0.08 K/UL
BASOPHILS NFR BLD: 0.9 %
BILIRUB SERPL-MCNC: 0.8 MG/DL
BUN SERPL-MCNC: 7 MG/DL
CALCIUM SERPL-MCNC: 8.3 MG/DL
CHLORIDE SERPL-SCNC: 109 MMOL/L
CO2 SERPL-SCNC: 20 MMOL/L
CREAT SERPL-MCNC: 0.7 MG/DL
DIFFERENTIAL METHOD: ABNORMAL
EOSINOPHIL # BLD AUTO: 0.2 K/UL
EOSINOPHIL NFR BLD: 1.7 %
ERYTHROCYTE [DISTWIDTH] IN BLOOD BY AUTOMATED COUNT: 14.4 %
EST. GFR  (AFRICAN AMERICAN): >60 ML/MIN/1.73 M^2
EST. GFR  (NON AFRICAN AMERICAN): >60 ML/MIN/1.73 M^2
GLUCOSE SERPL-MCNC: 82 MG/DL
HCT VFR BLD AUTO: 40.2 %
HGB BLD-MCNC: 13.2 G/DL
LYMPHOCYTES # BLD AUTO: 1.3 K/UL
LYMPHOCYTES NFR BLD: 14.8 %
MAGNESIUM SERPL-MCNC: 2 MG/DL
MCH RBC QN AUTO: 30.1 PG
MCHC RBC AUTO-ENTMCNC: 32.8 G/DL
MCV RBC AUTO: 92 FL
MONOCYTES # BLD AUTO: 0.9 K/UL
MONOCYTES NFR BLD: 10.9 %
NEUTROPHILS # BLD AUTO: 6.1 K/UL
NEUTROPHILS NFR BLD: 70.8 %
PHOSPHATE SERPL-MCNC: 3.4 MG/DL
PLATELET # BLD AUTO: 284 K/UL
PMV BLD AUTO: 10.6 FL
POTASSIUM SERPL-SCNC: 3.5 MMOL/L
PROT SERPL-MCNC: 5.5 G/DL
RBC # BLD AUTO: 4.38 M/UL
SODIUM SERPL-SCNC: 139 MMOL/L
WBC # BLD AUTO: 8.61 K/UL

## 2019-01-08 PROCEDURE — 63600175 PHARM REV CODE 636 W HCPCS: Performed by: NURSE PRACTITIONER

## 2019-01-08 PROCEDURE — 99233 PR SUBSEQUENT HOSPITAL CARE,LEVL III: ICD-10-PCS | Mod: ,,, | Performed by: INTERNAL MEDICINE

## 2019-01-08 PROCEDURE — 25000003 PHARM REV CODE 250: Performed by: NURSE PRACTITIONER

## 2019-01-08 PROCEDURE — 63600175 PHARM REV CODE 636 W HCPCS: Performed by: HOSPITALIST

## 2019-01-08 PROCEDURE — S0030 INJECTION, METRONIDAZOLE: HCPCS | Performed by: NURSE PRACTITIONER

## 2019-01-08 PROCEDURE — 85025 COMPLETE CBC W/AUTO DIFF WBC: CPT

## 2019-01-08 PROCEDURE — 80053 COMPREHEN METABOLIC PANEL: CPT

## 2019-01-08 PROCEDURE — 36415 COLL VENOUS BLD VENIPUNCTURE: CPT

## 2019-01-08 PROCEDURE — 99233 SBSQ HOSP IP/OBS HIGH 50: CPT | Mod: ,,, | Performed by: INTERNAL MEDICINE

## 2019-01-08 PROCEDURE — 25000003 PHARM REV CODE 250: Performed by: HOSPITALIST

## 2019-01-08 PROCEDURE — 11000001 HC ACUTE MED/SURG PRIVATE ROOM

## 2019-01-08 PROCEDURE — 84100 ASSAY OF PHOSPHORUS: CPT

## 2019-01-08 PROCEDURE — 94761 N-INVAS EAR/PLS OXIMETRY MLT: CPT

## 2019-01-08 PROCEDURE — 83735 ASSAY OF MAGNESIUM: CPT

## 2019-01-08 RX ADMIN — TRAMADOL HYDROCHLORIDE 50 MG: 50 TABLET, FILM COATED ORAL at 02:01

## 2019-01-08 RX ADMIN — OXYBUTYNIN CHLORIDE 10 MG: 5 TABLET, EXTENDED RELEASE ORAL at 09:01

## 2019-01-08 RX ADMIN — SODIUM CHLORIDE AND POTASSIUM CHLORIDE: .9; .15 SOLUTION INTRAVENOUS at 09:01

## 2019-01-08 RX ADMIN — APIXABAN 5 MG: 2.5 TABLET, FILM COATED ORAL at 09:01

## 2019-01-08 RX ADMIN — ONDANSETRON 8 MG: 8 TABLET, ORALLY DISINTEGRATING ORAL at 09:01

## 2019-01-08 RX ADMIN — PREDNISONE 1 MG: 1 TABLET ORAL at 09:01

## 2019-01-08 RX ADMIN — METRONIDAZOLE 500 MG: 500 INJECTION, SOLUTION INTRAVENOUS at 11:01

## 2019-01-08 RX ADMIN — TRAMADOL HYDROCHLORIDE 50 MG: 50 TABLET, FILM COATED ORAL at 07:01

## 2019-01-08 RX ADMIN — LOSARTAN POTASSIUM 100 MG: 50 TABLET, FILM COATED ORAL at 09:01

## 2019-01-08 RX ADMIN — HYDROCODONE BITARTRATE AND ACETAMINOPHEN 1 TABLET: 7.5; 325 TABLET ORAL at 03:01

## 2019-01-08 RX ADMIN — PANTOPRAZOLE SODIUM 40 MG: 40 TABLET, DELAYED RELEASE ORAL at 09:01

## 2019-01-08 RX ADMIN — NIFEDIPINE 30 MG: 30 TABLET, FILM COATED, EXTENDED RELEASE ORAL at 09:01

## 2019-01-08 RX ADMIN — CIPROFLOXACIN 400 MG: 2 INJECTION, SOLUTION INTRAVENOUS at 09:01

## 2019-01-08 RX ADMIN — SODIUM CHLORIDE AND POTASSIUM CHLORIDE: .9; .15 SOLUTION INTRAVENOUS at 01:01

## 2019-01-08 RX ADMIN — HYDROCODONE BITARTRATE AND ACETAMINOPHEN 1 TABLET: 7.5; 325 TABLET ORAL at 06:01

## 2019-01-08 RX ADMIN — FAMOTIDINE 20 MG: 20 TABLET ORAL at 09:01

## 2019-01-08 RX ADMIN — METRONIDAZOLE 500 MG: 500 INJECTION, SOLUTION INTRAVENOUS at 06:01

## 2019-01-08 RX ADMIN — HYDROCODONE BITARTRATE AND ACETAMINOPHEN 1 TABLET: 7.5; 325 TABLET ORAL at 09:01

## 2019-01-08 RX ADMIN — METRONIDAZOLE 500 MG: 500 INJECTION, SOLUTION INTRAVENOUS at 01:01

## 2019-01-08 NOTE — PROGRESS NOTES
[facility] Hospital Medicine  Progress Note    Patient Name: Lakshmi Juarez  MRN: [id number]  Patient Class: IP- Inpatient   Admission Date: 1/3/2019  Length of Stay: 4 days  Attending Physician: Maria Del Carmen Wild MD  Primary Care Provider: Bisi Rey MD        Subjective:     Principal Problem:Acute colitis    HPI:  The patient is a 83 y.o. female with hx of HTN and colon CA who presents with complaint of abdominal pain since yesterday evening. She states it started to the back, but moved to the mid abdomen. There is associated nausea, diarrhea, and abdominal distension. She had two episodes of diarrhea. Last bowel movement was about 4 hours ago. She also reports lack of appetite. Pt is able to pass gas. Pt was seen at urgent care and abdominal x-ray at that time revealed probably bowel obstruction. Pt has PShx of total abdominal hysterectomy with bilateral salpingoophorectomy, partial colon resection, and cardiac valve replacement. She may have had an appendectomy, but is unsure. She denies any fever, chills, vomiting, chest pain, SOB, lightheadedness, and weakness. No urinary sx.        Hospital Course:  CT of the abdomen/pelvis was done that revealed marked bowel wall thickening of the cecum with associated fat stranding.  The patient was started IV Cipro and Flagyl.  GI was consulted and based on colonoscopy that was done in 2016, does not recommend endoscopy at this time.   The patient developed abdominal distension and some nausea and vomiting, 2 view abdomen showed distended bowel loops and no free air.  NG tube was placed to low intermittent suction with 450cc of aspirate removed.  General Surgery consult pending.    Interval History: The patient had some mild epistaxis with NG tube in place.  With tube clamped bowel sounds are absent.      Review of Systems  Objective:     Vital Signs (Most Recent):  Temp: 97.9 °F (36.6 °C) (01/07/19 1622)  Pulse: 110 (01/07/19 1622)  Resp: 18  (01/07/19 1622)  BP: (!) 142/71 (01/07/19 1622)  SpO2: 96 % (01/07/19 1622) Vital Signs (24h Range):  Temp:  [97.4 °F (36.3 °C)-98.3 °F (36.8 °C)] 97.9 °F (36.6 °C)  Pulse:  [103-113] 110  Resp:  [14-18] 18  SpO2:  [94 %-96 %] 96 %  BP: (129-148)/(60-79) 142/71     Weight: 68.1 kg (150 lb 2.1 oz)  Body mass index is 23.51 kg/m².    Intake/Output Summary (Last 24 hours) at 1/7/2019 1751  Last data filed at 1/7/2019 0500  Gross per 24 hour   Intake 1320 ml   Output 450 ml   Net 870 ml      Physical Exam   Constitutional: She is oriented to person, place, and time. She appears well-developed and well-nourished.   Acutely ill appearing elderly female in mild distress with abdominal discomfort this am   HENT:   Head: Normocephalic.   Eyes: Conjunctivae are normal. Right eye exhibits no discharge. Left eye exhibits no discharge.   Neck: Normal range of motion. Neck supple.   Cardiovascular: Regular rhythm, normal heart sounds and intact distal pulses. Tachycardia present.   Pulses:       Radial pulses are 1+ on the right side, and 1+ on the left side.   Pulmonary/Chest: Effort normal and breath sounds normal. No respiratory distress.   Abdominal: Soft. Bowel sounds are normal. She exhibits distension. There is generalized tenderness.   Worsening distension today   Musculoskeletal: Normal range of motion.   Neurological: She is alert and oriented to person, place, and time. She has normal strength. GCS eye subscore is 4. GCS verbal subscore is 5. GCS motor subscore is 6.   Skin: Skin is warm and dry.   Psychiatric: She has a normal mood and affect. Her speech is normal and behavior is normal. Judgment and thought content normal.       Significant Labs:   CBC:   Recent Labs   Lab 01/06/19 0527 01/07/19 0447   WBC 9.34 8.65   HGB 13.1 13.4   HCT 39.6 40.5    274     CMP:   Recent Labs   Lab 01/06/19 0527 01/07/19 0447    140   K 3.8 3.3*   * 110   CO2 21* 20*   GLU 93 103   BUN 8 7*   CREATININE 0.8  0.7   CALCIUM 8.7 8.6*   PROT 5.4* 5.8*   ALBUMIN 2.5* 3.0*   BILITOT 1.2* 1.5*   ALKPHOS 72 78   AST 12 18   ALT 8* 12   ANIONGAP 8 10   EGFRNONAA >60 >60     Magnesium:   Recent Labs   Lab 01/06/19  0527 01/07/19  0447   MG 1.9 1.9       Significant Imaging: I have reviewed and interpreted all pertinent imaging results/findings within the past 24 hours.    Assessment/Plan:      * Acute colitis    Inflammatory changes in the cecum  IV Cipro/Flagyl to continue  Advance diet as tolerated per GI  Obtained flat and erect abdomen that showed worsening bowel distension and NG tube placed for decompression with suspected ileus versus bowel obstruction  General Surgery for additional recommendations pending     GERD (gastroesophageal reflux disease)    Will continue PPI       Essential hypertension    Normotensive currently  Continue losartan, nifedipine       Epistaxis    Monitor closely as the patient has a history of epistaxis  The patient takes aspirin for stroke prophylaxis with history of TIA  The patient takes Eliquis with history of Atrial flutter for stroke prophylaxis       Hypokalemia    Continue potassium supplementation in IV fluids         VTE Risk Mitigation (From admission, onward)        Ordered     apixaban tablet 5 mg  2 times daily      01/03/19 2228     IP VTE HIGH RISK PATIENT  Once      01/03/19 2228     Reason for No Pharmacological VTE Prophylaxis  Once      01/03/19 2228     Place JOCELIN hose  Until discontinued      01/03/19 2228     Place sequential compression device  Until discontinued      01/03/19 2228     Place sequential compression device  Until discontinued      01/03/19 2059     Place JOCELIN hose  Until discontinued      01/03/19 2053              Maria Del Carmen Wild MD  Department of Hospital Medicine   [facility]

## 2019-01-08 NOTE — PLAN OF CARE
Problem: Adult Inpatient Plan of Care  Goal: Plan of Care Review  Outcome: Ongoing (interventions implemented as appropriate)  PLAN OF CARE REVIEWED WITH PT.  PT AA+OX4.  ABLE TO MAKE NEEDS KNOWN.  DOES NOT APPEAR TO BE IN ANY DISTRESS.  C/O PAIN.  PAIN MEDICATION GIVEN AS ORDERED.  REQUIRES STAND BY ASSIST WITH ADLS.  CONT. OF B/B.  ABT THERAPY GIVEN AS ORDERED.  IV FLUIDS INFUSING AS ORDERED.  DURING THIS SHIFT PT AS AMBULATED IN HALLWAY.  PT REMAINS FREE FROM FALLS, INJURY, AND TRAUMA.  FALL PRECAUTIONS IN PLACE.  BED IN LOWEST POSITION WITH WHEELS LOCKED.  CALL LIGHT WITHIN REACH.  WILL CONTINUE TO MONITOR.

## 2019-01-08 NOTE — PROGRESS NOTES
Chief Complaint / Reason for Consult: colitis, PSBO    ROS: Pt c/o epistaxis and pain from the NGT. Her abd pain has resolved. She has had little output from her NGT. She has ambulated. No flatus. Feels distended. No bm        Patient Vitals for the past 24 hrs:   BP Temp Temp src Pulse Resp SpO2   01/08/19 0803 134/76 98.1 °F (36.7 °C) Oral 107 16 (!) 93 %   01/08/19 0536 139/83 98.1 °F (36.7 °C) -- 107 -- (!) 93 %   01/08/19 0012 139/74 98.1 °F (36.7 °C) -- 106 -- (!) 92 %   01/07/19 2028 139/72 98.1 °F (36.7 °C) Oral 107 18 95 %   01/07/19 1622 (!) 142/71 97.9 °F (36.6 °C) Oral 110 18 96 %   01/07/19 1318 (!) 148/79 97.9 °F (36.6 °C) Oral 109 16 (!) 94 %   01/07/19 1230 -- -- -- 103 18 95 %       Physical Exam:  Gen - Well developed, well nourished,mild distress  HEENT - Anicteric, NGT in place  CV - S1, S2, I/VI ISABELA  Lungs - CTA-B, normal excursion  Abd - Distended but soft, +BS, normal pitch, NTExt - No c/c/e  Neuro - No asterixis    Labs:  Recent Labs   Lab 01/08/19  0453   WBC 8.61   RBC 4.38   HGB 13.2   HCT 40.2      MCV 92   MCH 30.1   MCHC 32.8     Recent Labs   Lab 01/08/19  0453   CALCIUM 8.3*   PROT 5.5*      K 3.5   CO2 20*      BUN 7*   CREATININE 0.7   ALKPHOS 73   ALT 8*   AST 19   BILITOT 0.8     No results for input(s): PT, INR, APTT in the last 24 hours.    Imaging:  No new    Assessment:  This patient is a 83 y.o. female with cecal colitis and PSBO. Clinically she seems to be slowly improving.     Recommendations:  1.  Keep NGT clamped for now  2. KUB for comparison  3.  Likely start clear liquids

## 2019-01-08 NOTE — CONSULTS
DATE OF CONSULTATION:  01/07/2019.    HISTORY OF PRESENT ILLNESS:  The patient is an 83-year-old female who was   referred to the Emergency Room from Urgent Care with a one-day history of   abdominal pain.  The pain began in the right side of the abdomen and back and   appeared to move and involve the whole abdomen;  however, the right side was the   most symptomatic.  The patient stated she had a single episode of vomiting and   two episodes of diarrhea.  She denied fever, chills or weight loss.  The patient   also denies melena or hematochezia.    PAST MEDICAL HISTORY:  Significant for gynecologic surgery through a   Pfannenstiel incision.  The patient said that she had a malignant polyp, but it   is not clear to me that this was surgically resected rather than endoscopically   resected.    PAST MEDICAL HISTORY:  Significant for hypertension, gastroesophageal reflux   disease, valvular heart disease with valve replacement, cataract surgery and   TAHBSO.    ALLERGIES:  THE PATIENT IS ALLERGIC TO FREON.  She said when this was used to   free some skin lesions on her lower extremity, she developed extreme facial   swelling.  Additionally, the patient STATES SHE IS ALLERGIC TO CASHEWS.  She ate   cashews and had severe abdominal pain are we are not sure that this represents   a true allergy.    MEDICATIONS:  Include aspirin 81 mg, vitamin D3, Eliquis, losartan, nifedipine,   Ditropan, Protonix, Deltasone and Zantac.    SOCIAL HISTORY:  The patient is a former smoker.  She uses alcohol on a social   basis.  She states that she is a regular exerciser and walks in the park on a   daily basis.    REVIEW OF SYSTEMS:  No chest pain, shortness of breath, cough, sputum production   or hemoptysis.  No palpitations, no orthopnea.  No pedal edema.  No hematuria,   dysuria, urgency or frequency.  No motor weakness or tremor.  No joint swelling.    No dizziness, weakness, headaches or lightheadedness.    PHYSICAL  EXAMINATION:  GENERAL:  The patient is awake, alert and oriented.  HEENT:  She is normocephalic.  Extraocular movements are intact.  Conjunctivae   anicteric.  There is an NG tube present in the left naris.  NECK:  Supple.  Trachea midline.  CHEST:  Clear.  HEART:  Has a regular rate and rhythm.  ABDOMEN:  Soft.  It is nontender.  There is mild distention, no rebound, no   organomegaly.  No hernias.  The pelvis is stable.  EXTREMITIES:  Show no tenderness or edema.  PSYCHIATRIC:  Within normal limits.    CT scan on admission showed bowel wall thickening of the cecum with fat   stranding suggestive of colitis.    LABORATORY WORKUP:  Showed normal hematocrit with white blood cell count of   18,000 on admission.  This has come back down to normal limits on IV fluids and   bowel rest.    IMPRESSION:  Abdominal pain, colitis of the cecum, etiology undetermined.    Nausea, vomiting and small bowel distention may represent secondary dilatation   due to colitis, adhesions or other mechanical causes of blockage.  In any case,   the appropriate treatment at this point is IV fluids, antibiotics to treat the   colitis, continued NG suction.  No evidence of immediately compelling surgical   abdomen.    PLAN:  Discussed at length with the patient and her  and explained the   natural course of ileus and small bowel obstruction.  Recommendation of   continued conservative therapy with the possibility of ventral laparotomy.  I   will follow with serial exams.      BAYRON/IN  dd: 01/07/2019 17:57:35 (CST)  td: 01/07/2019 18:36:12 (CST)  Doc ID   #[id number]  Job ID #[id number]    CC:

## 2019-01-08 NOTE — ASSESSMENT & PLAN NOTE
Monitor closely as the patient has a history of epistaxis  The patient takes aspirin for stroke prophylaxis with history of TIA  The patient takes Eliquis with history of Atrial flutter for stroke prophylaxis

## 2019-01-08 NOTE — PLAN OF CARE
Problem: Adult Inpatient Plan of Care  Goal: Plan of Care Review  Outcome: Ongoing (interventions implemented as appropriate)  No significant events this shift. Remains free from fall, injury, and skin breakdown. Ambulates with assist to bathroom. VS stable on RA and afebrile. Positions self independently. Pain controlled with PO PRN meds. Neuro checks WDL. Maintained NPO status. IV site WNL. NG tube in place, low intermittent suction. Plan of care reviewed with patient and all questions answered. Bed low, locked w/ bed alarm on. Call light within reach. Purposeful rounding performed. No other complaints at this time.

## 2019-01-08 NOTE — PROGRESS NOTES
[facility] Hospital Medicine  Progress Note    Patient Name: Lakshmi Juarez  MRN: [id number]  Patient Class: IP- Inpatient   Admission Date: 1/3/2019  Length of Stay: 5 days  Attending Physician: STEPH Solorio MD  Primary Care Provider: Bisi Rey MD        Subjective:     Principal Problem:Acute colitis    HPI:  The patient is a 83 y.o. female with hx of HTN and colon CA who presents with complaint of abdominal pain since yesterday evening. She states it started to the back, but moved to the mid abdomen. There is associated nausea, diarrhea, and abdominal distension. She had two episodes of diarrhea. Last bowel movement was about 4 hours ago. She also reports lack of appetite. Pt is able to pass gas. Pt was seen at urgent care and abdominal x-ray at that time revealed probably bowel obstruction. Pt has PShx of total abdominal hysterectomy with bilateral salpingoophorectomy, partial colon resection, and cardiac valve replacement. She may have had an appendectomy, but is unsure. She denies any fever, chills, vomiting, chest pain, SOB, lightheadedness, and weakness. No urinary sx.      Hospital Course:  CT of the abdomen/pelvis was done that revealed marked bowel wall thickening of the cecum with associated fat stranding.  The patient was started IV Cipro and Flagyl.  GI was consulted and based on colonoscopy that was done in 2016, does not recommend endoscopy at this time.   The patient developed abdominal distension and some nausea and vomiting, 2 view abdomen showed distended bowel loops and no free air.  NG tube was placed to low intermittent suction with 450cc of aspirate removed.  General Surgery consult pending.    Interval History: No acute events overnight. Hopeful to get rid of NGT; still with flatus but no BM. No other concerns at this time.    Review of Systems   Constitutional: Negative for chills and fever.   Respiratory: Negative for cough and shortness of breath.     Cardiovascular: Negative for chest pain and palpitations.   Gastrointestinal: Negative for abdominal pain, nausea and vomiting.     Objective:     Vital Signs (Most Recent):  Temp: 97.9 °F (36.6 °C) (01/08/19 1559)  Pulse: 109 (01/08/19 1559)  Resp: 18 (01/08/19 1559)  BP: 129/68 (01/08/19 1559)  SpO2: (!) 93 % (01/08/19 1559) Vital Signs (24h Range):  Temp:  [97.9 °F (36.6 °C)-98.1 °F (36.7 °C)] 97.9 °F (36.6 °C)  Pulse:  [106-109] 109  Resp:  [16-18] 18  SpO2:  [92 %-95 %] 93 %  BP: (129-139)/(68-83) 129/68     Weight: 68.1 kg (150 lb 2.1 oz)  Body mass index is 23.51 kg/m².    Intake/Output Summary (Last 24 hours) at 1/8/2019 1828  Last data filed at 1/8/2019 1700  Gross per 24 hour   Intake 2888 ml   Output 300 ml   Net 2588 ml      Physical Exam   Constitutional: She is oriented to person, place, and time. She appears well-developed and well-nourished. No distress.   HENT:   Head: Normocephalic and atraumatic.   Eyes: Conjunctivae and EOM are normal.   Cardiovascular: Regular rhythm, S1 normal, S2 normal and intact distal pulses. Tachycardia present.   Pulmonary/Chest: Effort normal and breath sounds normal.   Abdominal: Soft. She exhibits distension (mild). There is tenderness (mild).   Musculoskeletal: Normal range of motion. She exhibits no edema.   Neurological: She is alert and oriented to person, place, and time.   Skin: Skin is warm and dry.   Nursing note and vitals reviewed.    Significant Labs:   CBC:  Recent Labs   Lab 01/07/19  0447 01/08/19  0453   WBC 8.65 8.61   HGB 13.4 13.2   HCT 40.5 40.2    284   GRAN 72.1  6.2 70.8  6.1   LYMPH 13.4*  1.2 14.8*  1.3   MONO 11.2  1.0 10.9  0.9   EOS 0.2 0.2   BASO 0.05 0.08      BMP:  Recent Labs   Lab 01/07/19  0447 01/08/19  0453    139   K 3.3* 3.5    109   CO2 20* 20*   BUN 7* 7*   CREATININE 0.7 0.7    82   CALCIUM 8.6* 8.3*   MG 1.9 2.0   PHOS 3.0 3.4     Significant Imaging:   KUB 01/08/19:  Interval insertion of a NG  tube, which appears in good position with the tip and proximal port projecting over left upper quadrant of the abdomen. Residual contrast again present within the colon. Prominent air-filled loops of small bowel and colon, similar to the recent study of 01/06/2019. Findings suggest ileus or obstruction. No free air. Calcified gallstones over the right upper quadrant.    Assessment/Plan:      * Acute colitis    - Bowel distention appears stable on KUB; advance diet as tolerated as feasible.  - Continuing ciprofloxacin 400mg IV q12hr, metronidazole 500mg IV q8hr.  - Appreciate GI, general surgery assistance.     Epistaxis    - h/o epistaxis prior.  - On aspirin at home with h/o TIA for stroke prophylaxis, apixaban as above.     Hypokalemia    - Replete PRN.     GERD (gastroesophageal reflux disease)    - Continuing pantoprazole 40mg PO daily.     Essential hypertension    - Continuing losartan 100mg PO daily, nifedipine 30mg PO daily.       VTE Risk Mitigation (From admission, onward)        Ordered     apixaban tablet 5 mg  2 times daily      01/03/19 2228     IP VTE HIGH RISK PATIENT  Once      01/03/19 2228     Reason for No Pharmacological VTE Prophylaxis  Once      01/03/19 2228     Place sequential compression device  Until discontinued      01/03/19 2059        STEPH Chandra MD  Department of Hospital Medicine   [facility]  [phone]

## 2019-01-08 NOTE — NURSING
PT'S SCHEDULED MEDICATIONS GIVEN LATE.  ON CALL NP (CATRINA) NOTIFIED BY THIS NURSE (RM).  PER ON VIVIAN NP (CATRINA) OKAY TO GIVE SCHEDULE NIGHT TIME MEDICATIONS AT 2100.  WILL CONTINUE TO MONITOR.

## 2019-01-09 LAB
ANION GAP SERPL CALC-SCNC: 8 MMOL/L
BACTERIA BLD CULT: NORMAL
BACTERIA BLD CULT: NORMAL
BUN SERPL-MCNC: 7 MG/DL
CALCIUM SERPL-MCNC: 8.5 MG/DL
CHLORIDE SERPL-SCNC: 108 MMOL/L
CO2 SERPL-SCNC: 23 MMOL/L
CREAT SERPL-MCNC: 0.7 MG/DL
EST. GFR  (AFRICAN AMERICAN): >60 ML/MIN/1.73 M^2
EST. GFR  (NON AFRICAN AMERICAN): >60 ML/MIN/1.73 M^2
GLUCOSE SERPL-MCNC: 94 MG/DL
MAGNESIUM SERPL-MCNC: 1.9 MG/DL
PHOSPHATE SERPL-MCNC: 3 MG/DL
POTASSIUM SERPL-SCNC: 3.5 MMOL/L
SODIUM SERPL-SCNC: 139 MMOL/L

## 2019-01-09 PROCEDURE — 11000001 HC ACUTE MED/SURG PRIVATE ROOM

## 2019-01-09 PROCEDURE — S0030 INJECTION, METRONIDAZOLE: HCPCS | Performed by: NURSE PRACTITIONER

## 2019-01-09 PROCEDURE — 80048 BASIC METABOLIC PNL TOTAL CA: CPT

## 2019-01-09 PROCEDURE — 99232 SBSQ HOSP IP/OBS MODERATE 35: CPT | Mod: ,,, | Performed by: INTERNAL MEDICINE

## 2019-01-09 PROCEDURE — 36415 COLL VENOUS BLD VENIPUNCTURE: CPT

## 2019-01-09 PROCEDURE — 94761 N-INVAS EAR/PLS OXIMETRY MLT: CPT

## 2019-01-09 PROCEDURE — 84100 ASSAY OF PHOSPHORUS: CPT

## 2019-01-09 PROCEDURE — 25000003 PHARM REV CODE 250: Performed by: HOSPITALIST

## 2019-01-09 PROCEDURE — 83735 ASSAY OF MAGNESIUM: CPT

## 2019-01-09 PROCEDURE — 63600175 PHARM REV CODE 636 W HCPCS: Performed by: HOSPITALIST

## 2019-01-09 PROCEDURE — 25000003 PHARM REV CODE 250: Performed by: NURSE PRACTITIONER

## 2019-01-09 PROCEDURE — 63600175 PHARM REV CODE 636 W HCPCS: Performed by: NURSE PRACTITIONER

## 2019-01-09 PROCEDURE — 99232 PR SUBSEQUENT HOSPITAL CARE,LEVL II: ICD-10-PCS | Mod: ,,, | Performed by: INTERNAL MEDICINE

## 2019-01-09 RX ADMIN — SODIUM CHLORIDE AND POTASSIUM CHLORIDE: .9; .15 SOLUTION INTRAVENOUS at 09:01

## 2019-01-09 RX ADMIN — TRAMADOL HYDROCHLORIDE 50 MG: 50 TABLET, FILM COATED ORAL at 08:01

## 2019-01-09 RX ADMIN — OXYBUTYNIN CHLORIDE 10 MG: 5 TABLET, EXTENDED RELEASE ORAL at 08:01

## 2019-01-09 RX ADMIN — METRONIDAZOLE 500 MG: 500 INJECTION, SOLUTION INTRAVENOUS at 01:01

## 2019-01-09 RX ADMIN — FAMOTIDINE 20 MG: 20 TABLET ORAL at 08:01

## 2019-01-09 RX ADMIN — APIXABAN 5 MG: 2.5 TABLET, FILM COATED ORAL at 08:01

## 2019-01-09 RX ADMIN — TRAMADOL HYDROCHLORIDE 50 MG: 50 TABLET, FILM COATED ORAL at 05:01

## 2019-01-09 RX ADMIN — METRONIDAZOLE 500 MG: 500 INJECTION, SOLUTION INTRAVENOUS at 05:01

## 2019-01-09 RX ADMIN — HYDROCODONE BITARTRATE AND ACETAMINOPHEN 1 TABLET: 7.5; 325 TABLET ORAL at 01:01

## 2019-01-09 RX ADMIN — FAMOTIDINE 20 MG: 20 TABLET ORAL at 09:01

## 2019-01-09 RX ADMIN — CIPROFLOXACIN 400 MG: 2 INJECTION, SOLUTION INTRAVENOUS at 09:01

## 2019-01-09 RX ADMIN — HYDROCODONE BITARTRATE AND ACETAMINOPHEN 1 TABLET: 7.5; 325 TABLET ORAL at 09:01

## 2019-01-09 RX ADMIN — APIXABAN 5 MG: 2.5 TABLET, FILM COATED ORAL at 09:01

## 2019-01-09 RX ADMIN — NIFEDIPINE 30 MG: 30 TABLET, FILM COATED, EXTENDED RELEASE ORAL at 08:01

## 2019-01-09 RX ADMIN — SODIUM CHLORIDE AND POTASSIUM CHLORIDE: .9; .15 SOLUTION INTRAVENOUS at 07:01

## 2019-01-09 RX ADMIN — PANTOPRAZOLE SODIUM 40 MG: 40 TABLET, DELAYED RELEASE ORAL at 08:01

## 2019-01-09 RX ADMIN — METRONIDAZOLE 500 MG: 500 INJECTION, SOLUTION INTRAVENOUS at 11:01

## 2019-01-09 NOTE — PROGRESS NOTES
[facility] Hospital Medicine  Progress Note    Patient Name: Lakshmi Juarez  MRN: [id number]  Patient Class: IP- Inpatient   Admission Date: 1/3/2019  Length of Stay: 6 days  Attending Physician: STEPH Solorio MD  Primary Care Provider: Bisi Rey MD        Subjective:     Principal Problem:Acute colitis    HPI:  The patient is a 83 y.o. female with hx of HTN and colon CA who presents with complaint of abdominal pain since yesterday evening. She states it started to the back, but moved to the mid abdomen. There is associated nausea, diarrhea, and abdominal distension. She had two episodes of diarrhea. Last bowel movement was about 4 hours ago. She also reports lack of appetite. Pt is able to pass gas. Pt was seen at urgent care and abdominal x-ray at that time revealed probably bowel obstruction. Pt has PShx of total abdominal hysterectomy with bilateral salpingoophorectomy, partial colon resection, and cardiac valve replacement. She may have had an appendectomy, but is unsure. She denies any fever, chills, vomiting, chest pain, SOB, lightheadedness, and weakness. No urinary sx.        Hospital Course:  CT of the abdomen/pelvis was done that revealed marked bowel wall thickening of the cecum with associated fat stranding.  The patient was started IV Cipro and Flagyl.  GI was consulted and based on colonoscopy that was done in 2016, does not recommend endoscopy at this time.   The patient developed abdominal distension and some nausea and vomiting, 2 view abdomen showed distended bowel loops and no free air.  NG tube was placed to low intermittent suction with 450cc of aspirate removed.  General Surgery consult pending.    Interval History: No acute events overnight. Feeling well this morning. No other concerns at this time.    Review of Systems   Constitutional: Negative for chills and fever.   Respiratory: Negative for cough and shortness of breath.    Cardiovascular: Negative for  chest pain and palpitations.   Gastrointestinal: Negative for abdominal pain, nausea and vomiting.     Objective:     Vital Signs (Most Recent):  Temp: 97.7 °F (36.5 °C) (01/09/19 0729)  Pulse: (!) 115 (01/09/19 0729)  Resp: 18 (01/09/19 0729)  BP: 126/77 (01/09/19 0729)  SpO2: 96 % (01/09/19 0729) Vital Signs (24h Range):  Temp:  [97.7 °F (36.5 °C)-98.2 °F (36.8 °C)] 97.7 °F (36.5 °C)  Pulse:  [108-115] 115  Resp:  [18] 18  SpO2:  [93 %-96 %] 96 %  BP: (126-135)/(62-77) 126/77     Weight: 68.1 kg (150 lb 2.1 oz)  Body mass index is 23.51 kg/m².    Intake/Output Summary (Last 24 hours) at 1/9/2019 1610  Last data filed at 1/8/2019 1700  Gross per 24 hour   Intake 240 ml   Output --   Net 240 ml      Physical Exam   Constitutional: She is oriented to person, place, and time. She appears well-developed and well-nourished. No distress.   HENT:   Head: Normocephalic and atraumatic.   Eyes: Conjunctivae and EOM are normal.   Cardiovascular: Regular rhythm, S1 normal, S2 normal and intact distal pulses. Tachycardia present.   Pulmonary/Chest: Effort normal and breath sounds normal.   Abdominal: Soft. She exhibits distension (mild). There is tenderness (mild).   Musculoskeletal: Normal range of motion. She exhibits no edema.   Neurological: She is alert and oriented to person, place, and time.   Skin: Skin is warm and dry.   Nursing note and vitals reviewed.    Significant Labs:   CBC:  Recent Labs   Lab 01/08/19  0453   WBC 8.61   HGB 13.2   HCT 40.2      GRAN 70.8  6.1   LYMPH 14.8*  1.3   MONO 10.9  0.9   EOS 0.2   BASO 0.08      BMP:  Recent Labs   Lab 01/08/19  0453 01/09/19  0438    139   K 3.5 3.5    108   CO2 20* 23   BUN 7* 7*   CREATININE 0.7 0.7   GLU 82 94   CALCIUM 8.3* 8.5*   MG 2.0 1.9   PHOS 3.4 3.0     Significant Imaging:   KUB 01/08/19:  Interval insertion of a NG tube, which appears in good position with the tip and proximal port projecting over left upper quadrant of the abdomen.  Residual contrast again present within the colon. Prominent air-filled loops of small bowel and colon, similar to the recent study of 01/06/2019. Findings suggest ileus or obstruction. No free air. Calcified gallstones over the right upper quadrant.    Assessment/Plan:      * Acute colitis    - Bowel distention appears stable on KUB; advance diet as tolerated as feasible.  - If tolerating clear liquid diet by this afternoon, will discuss with GI/surgery regarding potentially removing NGT.  - Continuing ciprofloxacin 400mg IV q12hr, metronidazole 500mg IV q8hr.  - Appreciate GI, general surgery assistance.     Epistaxis    - h/o epistaxis prior.  - On aspirin at home with h/o TIA for stroke prophylaxis, apixaban as above.     Hypokalemia    - Replete PRN.     GERD (gastroesophageal reflux disease)    - Continuing pantoprazole 40mg PO daily.     Essential hypertension    - Continuing losartan 100mg PO daily, nifedipine 30mg PO daily.       VTE Risk Mitigation (From admission, onward)        Ordered     apixaban tablet 5 mg  2 times daily      01/03/19 2228     IP VTE HIGH RISK PATIENT  Once      01/03/19 2228     Reason for No Pharmacological VTE Prophylaxis  Once      01/03/19 2228     Place sequential compression device  Until discontinued      01/03/19 2059        STEPH Chandra MD  Department of Hospital Medicine   [facility]  [phone]

## 2019-01-09 NOTE — PROGRESS NOTES
avss  Passing flatus  No bm      PE  Chest--clear  Cor--rrr  Abd--soft,+ bs  Ext--no tenderness    Plan  liqs

## 2019-01-09 NOTE — PLAN OF CARE
Problem: Adult Inpatient Plan of Care  Goal: Plan of Care Review  Outcome: Ongoing (interventions implemented as appropriate)  Plan of Care reviewed with patient. Pt up ad shaji with steady gait in samuels and in room. Pt free from falls or injury. Pain controlled with po pain medications. Purposeful rounding done. Ng tube to Lt nare patent. Tolerating clear liquids well. No nausea or vomiting noted. Safety maintained. Bed in lowest position and locked. Voiding adequately without difficulty. Call light in reach.

## 2019-01-09 NOTE — ASSESSMENT & PLAN NOTE
- Bowel distention appears stable on KUB; advance diet as tolerated as feasible.  - If tolerating clear liquid diet by this afternoon, will discuss with GI/surgery regarding potentially removing NGT.  - Continuing ciprofloxacin 400mg IV q12hr, metronidazole 500mg IV q8hr.  - Appreciate GI, general surgery assistance.

## 2019-01-09 NOTE — SUBJECTIVE & OBJECTIVE
Interval History: No acute events overnight. Hopeful to get rid of NGT; still with flatus but no BM. No other concerns at this time.    Review of Systems   Constitutional: Negative for chills and fever.   Respiratory: Negative for cough and shortness of breath.    Cardiovascular: Negative for chest pain and palpitations.   Gastrointestinal: Negative for abdominal pain, nausea and vomiting.     Objective:     Vital Signs (Most Recent):  Temp: 97.9 °F (36.6 °C) (01/08/19 1559)  Pulse: 109 (01/08/19 1559)  Resp: 18 (01/08/19 1559)  BP: 129/68 (01/08/19 1559)  SpO2: (!) 93 % (01/08/19 1559) Vital Signs (24h Range):  Temp:  [97.9 °F (36.6 °C)-98.1 °F (36.7 °C)] 97.9 °F (36.6 °C)  Pulse:  [106-109] 109  Resp:  [16-18] 18  SpO2:  [92 %-95 %] 93 %  BP: (129-139)/(68-83) 129/68     Weight: 68.1 kg (150 lb 2.1 oz)  Body mass index is 23.51 kg/m².    Intake/Output Summary (Last 24 hours) at 1/8/2019 1828  Last data filed at 1/8/2019 1700  Gross per 24 hour   Intake 2888 ml   Output 300 ml   Net 2588 ml      Physical Exam   Constitutional: She is oriented to person, place, and time. She appears well-developed and well-nourished. No distress.   HENT:   Head: Normocephalic and atraumatic.   Eyes: Conjunctivae and EOM are normal.   Cardiovascular: Regular rhythm, S1 normal, S2 normal and intact distal pulses. Tachycardia present.   Pulmonary/Chest: Effort normal and breath sounds normal.   Abdominal: Soft. She exhibits distension (mild). There is tenderness (mild).   Musculoskeletal: Normal range of motion. She exhibits no edema.   Neurological: She is alert and oriented to person, place, and time.   Skin: Skin is warm and dry.   Nursing note and vitals reviewed.    Significant Labs:   CBC:  Recent Labs   Lab 01/07/19  0447 01/08/19  0453   WBC 8.65 8.61   HGB 13.4 13.2   HCT 40.5 40.2    284   GRAN 72.1  6.2 70.8  6.1   LYMPH 13.4*  1.2 14.8*  1.3   MONO 11.2  1.0 10.9  0.9   EOS 0.2 0.2   BASO 0.05 0.08       BMP:  Recent Labs   Lab 01/07/19  0447 01/08/19  0453    139   K 3.3* 3.5    109   CO2 20* 20*   BUN 7* 7*   CREATININE 0.7 0.7    82   CALCIUM 8.6* 8.3*   MG 1.9 2.0   PHOS 3.0 3.4     Significant Imaging:   KUB 01/08/19:  Interval insertion of a NG tube, which appears in good position with the tip and proximal port projecting over left upper quadrant of the abdomen. Residual contrast again present within the colon. Prominent air-filled loops of small bowel and colon, similar to the recent study of 01/06/2019. Findings suggest ileus or obstruction. No free air. Calcified gallstones over the right upper quadrant.

## 2019-01-09 NOTE — PLAN OF CARE
Problem: Adult Inpatient Plan of Care  Goal: Plan of Care Review  Outcome: Ongoing (interventions implemented as appropriate)  Pt remains free from falls. Vitals were stable throughout the night on room air. Positions self independently. Pain managed with PO medications, complaints of nausea maintained with PO medication. Bed in low position and call light within reach. Will continue to monitor.

## 2019-01-09 NOTE — ASSESSMENT & PLAN NOTE
- Bowel distention appears stable on KUB; advance diet as tolerated as feasible.  - Continuing ciprofloxacin 400mg IV q12hr, metronidazole 500mg IV q8hr.  - Appreciate GI, general surgery assistance.

## 2019-01-09 NOTE — PROGRESS NOTES
Gastroenterology Progress Note    Reason for Consult: cecal colitis, partial SBO    Subjective:  No issues overnight.  Still having abdominal discomfort, but is more bothered by NG tube being in place.  Has been clamped since yesterday.  Tolerated clears with a little bit of burping/regurgitation but no vomiting.  Passing gas.  No bowel movements.  Has been up moving around and sitting in chair.     ROS:  Gen: no F/C  CV: no CP/palpitations  Resp: no SOB/wheezing    Physical Exam  Vitals:    01/09/19 0729   BP: 126/77   Pulse: (!) 115   Resp: 18   Temp: 97.7 °F (36.5 °C)     Physical Exam  Gen: well developed, well nourished, no distress  HEENT: NG tube in place, clamped  Abd: soft, mild distension, no guarding or rebound    Medications/Infusions:  Current Facility-Administered Medications   Medication Dose Route Frequency Provider Last Rate Last Dose    0/9% NACL & POTASSIUM CHLORIDE 20 MEQ/L 1,000 mL infusion   Intravenous Continuous Maria Del Carmen Wild  mL/hr at 01/09/19 0904      acetaminophen tablet 650 mg  650 mg Oral Q4H PRN Aquilino Ludwig NP        apixaban tablet 5 mg  5 mg Oral BID Aquilino Ludwig NP   5 mg at 01/09/19 0855    benzocaine 20 % mouth spray   Mouth/Throat QID PRN Maria Del Carmen Wild MD        ciprofloxacin (CIPRO)400mg/200ml D5W IVPB 400 mg  400 mg Intravenous Q12H Aquilino Ludwig  mL/hr at 01/09/19 0906 400 mg at 01/09/19 0906    famotidine tablet 20 mg  20 mg Oral BID Aquilino Ludwig NP   20 mg at 01/09/19 0855    HYDROcodone-acetaminophen 7.5-325 mg per tablet 1 tablet  1 tablet Oral Q6H PRN Maria Del Carmen Wild MD   1 tablet at 01/08/19 2138    losartan tablet 100 mg  100 mg Oral Daily Aquilino uLdwig NP   100 mg at 01/08/19 0917    metronidazole IVPB 500 mg  500 mg Intravenous Q8H Aquilino Ludwig  mL/hr at 01/09/19 0528 500 mg at 01/09/19 0528    NIFEdipine 24 hr tablet 30 mg  30 mg Oral Daily Aquilino Ludwig NP   30 mg at 01/09/19 0847     ondansetron disintegrating tablet 8 mg  8 mg Oral Q8H PRN Aquilino Ludwig NP   8 mg at 01/08/19 2145    oxybutynin 24 hr tablet 10 mg  10 mg Oral Daily Aquilino Ludwig NP   10 mg at 01/09/19 0855    pantoprazole EC tablet 40 mg  40 mg Oral Daily Aquilino Ludwig NP   40 mg at 01/09/19 0855    predniSONE tablet 1 mg  1 mg Oral Daily Aquilino Ludwig NP   1 mg at 01/08/19 0917    sodium chloride 0.9% flush 5 mL  5 mL Intravenous PRN Aquilino Ludwig NP        traMADol tablet 50 mg  50 mg Oral Q4H PRN Maria Del Carmen Wild MD   50 mg at 01/09/19 0855       Intake and Output:    Intake/Output Summary (Last 24 hours) at 1/9/2019 1024  Last data filed at 1/8/2019 1700  Gross per 24 hour   Intake 240 ml   Output --   Net 240 ml       Labs:  Lab Results   Component Value Date/Time    WBC 8.61 01/08/2019 04:53 AM    HGB 13.2 01/08/2019 04:53 AM    HCT 40.2 01/08/2019 04:53 AM     01/08/2019 04:53 AM    MCV 92 01/08/2019 04:53 AM     01/09/2019 04:38 AM    K 3.5 01/09/2019 04:38 AM     01/09/2019 04:38 AM    CO2 23 01/09/2019 04:38 AM    BUN 7 (L) 01/09/2019 04:38 AM    CREATININE 0.7 01/09/2019 04:38 AM    GLU 94 01/09/2019 04:38 AM    CALCIUM 8.5 (L) 01/09/2019 04:38 AM    MG 1.9 01/09/2019 04:38 AM    PHOS 3.0 01/09/2019 04:38 AM    INR 1.0 01/03/2019 04:28 PM    APTT 26.6 04/03/2011 04:25 AM   ]  Lab Results   Component Value Date/Time    PROT 5.5 (L) 01/08/2019 04:53 AM    ALBUMIN 2.9 (L) 01/08/2019 04:53 AM    BILITOT 0.8 01/08/2019 04:53 AM    BILIDIR 0.3 03/22/2011 02:22 PM    AST 19 01/08/2019 04:53 AM    ALT 8 (L) 01/08/2019 04:53 AM    ALKPHOS 73 01/08/2019 04:53 AM   ]    Imaging and Procedures:  Labs and imaging results were reviewed.  AXR 1/8/19:  Interval insertion of a NG tube, which appears in good position with the tip and proximal port projecting over left upper quadrant of the abdomen.    Residual contrast again present within the colon.  Prominent air-filled loops of  small bowel and colon , similar to the recent study of 01/06/2019.  Findings suggest ileus or obstruction.  No free air.    Calcified gallstones over the right upper quadrant.    Assessment:  Lakshmi Juarez is a 83 y.o. female with a history of colon cancer s/p resection who presented with abdominal pain, N/V and was found to have cecal colitis and a partial SBO.    Plan:  - keep NG tube clamped and continue liquid diet  - will add Boost Breeze to help with nutrition  - encouraged ambulation  - keep K around 4 and Mg around 2  - if tolerates liquid diet today with tube remaining clamped, can likely pull this afternoon/evening, but will have Dr. Martins weigh in on this as well  - can continue cipro/flagyl x 1 week for possible infectious colitis involving cecum    Astrid Trevino MD

## 2019-01-09 NOTE — PHARMACY MED REC
"Admission Medication Reconciliation - Pharmacy Consult Note    The home medication history was taken by Jennie Lozano CPhT.  Based on information gathered and subsequent review by the clinical pharmacist, the items below may need attention.     You may go to "Admission" then "Reconcile Home Medications" tabs to review and/or act upon these items.     Potentially problematic discrepancies with current MAR  o Patient IS NOT taking the following which was ordered upon admit  o Ranitidine 150 mg PO BID  - Inpatient formualry order = famotidine 20 mg PO BID  - Pt also currently on pantoprazole as well  o Prednisone 1 mg PO daily    Medications Prior to Admission   Medication Sig Dispense Refill Last Dose    aspirin (ECOTRIN) 81 MG EC tablet Take 81 mg by mouth once daily.   Taking    DYMISTA 137-50 mcg/spray Spry nassal spray 1 spray by Each Nare route 2 (two) times daily.  5 Not Taking    ELIQUIS 5 mg Tab TAKE 1 TABLET BY MOUTH TWICE DAILY 60 tablet 5 Taking    losartan (COZAAR) 100 MG tablet Take 100 mg by mouth once daily.   Taking    NIFEdipine (PROCARDIA-XL) 30 MG (OSM) 24 hr tablet TAKE 1 TABLET(30 MG) BY MOUTH EVERY DAY 30 tablet 0 Taking    oxybutynin (DITROPAN XL) 10 MG 24 hr tablet Take 10 mg by mouth once daily.   Taking    pantoprazole (PROTONIX) 40 MG tablet Take 40 mg by mouth once daily.   Taking    pseudoephedrine HCl (SUDAFED ORAL) Take 1 tablet by mouth once daily.          Please address this information as you see fit.  Feel free to contact us if you have any questions or require assistance.    Skyler Linda, Pharm.D., BCPS  [phone]                .  .          "

## 2019-01-09 NOTE — ASSESSMENT & PLAN NOTE
- h/o epistaxis prior.  - On aspirin at home with h/o TIA for stroke prophylaxis, apixaban as above.

## 2019-01-09 NOTE — SUBJECTIVE & OBJECTIVE
Interval History: No acute events overnight. Feeling well this morning. No other concerns at this time.    Review of Systems   Constitutional: Negative for chills and fever.   Respiratory: Negative for cough and shortness of breath.    Cardiovascular: Negative for chest pain and palpitations.   Gastrointestinal: Negative for abdominal pain, nausea and vomiting.     Objective:     Vital Signs (Most Recent):  Temp: 97.7 °F (36.5 °C) (01/09/19 0729)  Pulse: (!) 115 (01/09/19 0729)  Resp: 18 (01/09/19 0729)  BP: 126/77 (01/09/19 0729)  SpO2: 96 % (01/09/19 0729) Vital Signs (24h Range):  Temp:  [97.7 °F (36.5 °C)-98.2 °F (36.8 °C)] 97.7 °F (36.5 °C)  Pulse:  [108-115] 115  Resp:  [18] 18  SpO2:  [93 %-96 %] 96 %  BP: (126-135)/(62-77) 126/77     Weight: 68.1 kg (150 lb 2.1 oz)  Body mass index is 23.51 kg/m².    Intake/Output Summary (Last 24 hours) at 1/9/2019 1610  Last data filed at 1/8/2019 1700  Gross per 24 hour   Intake 240 ml   Output --   Net 240 ml      Physical Exam   Constitutional: She is oriented to person, place, and time. She appears well-developed and well-nourished. No distress.   HENT:   Head: Normocephalic and atraumatic.   Eyes: Conjunctivae and EOM are normal.   Cardiovascular: Regular rhythm, S1 normal, S2 normal and intact distal pulses. Tachycardia present.   Pulmonary/Chest: Effort normal and breath sounds normal.   Abdominal: Soft. She exhibits distension (mild). There is tenderness (mild).   Musculoskeletal: Normal range of motion. She exhibits no edema.   Neurological: She is alert and oriented to person, place, and time.   Skin: Skin is warm and dry.   Nursing note and vitals reviewed.    Significant Labs:   CBC:  Recent Labs   Lab 01/08/19  0453   WBC 8.61   HGB 13.2   HCT 40.2      GRAN 70.8  6.1   LYMPH 14.8*  1.3   MONO 10.9  0.9   EOS 0.2   BASO 0.08      BMP:  Recent Labs   Lab 01/08/19  0453 01/09/19  0438    139   K 3.5 3.5    108   CO2 20* 23   BUN 7* 7*    CREATININE 0.7 0.7   GLU 82 94   CALCIUM 8.3* 8.5*   MG 2.0 1.9   PHOS 3.4 3.0     Significant Imaging:   KUB 01/08/19:  Interval insertion of a NG tube, which appears in good position with the tip and proximal port projecting over left upper quadrant of the abdomen. Residual contrast again present within the colon. Prominent air-filled loops of small bowel and colon, similar to the recent study of 01/06/2019. Findings suggest ileus or obstruction. No free air. Calcified gallstones over the right upper quadrant.

## 2019-01-09 NOTE — PLAN OF CARE
Problem: Adult Inpatient Plan of Care  Goal: Plan of Care Review  Outcome: Ongoing (interventions implemented as appropriate)  Plan of Care reviewed with patient and daughter. Pt up ad shaji in the samuels and to the bathroom without difficulty. Pt free from falls or injury. Ng tube to Lt nare patent. Pt tolerating clear liquid well. Pain controlled with po pain medication. Pt denies any c/o discomfort @ this time. Purposeful rounding done. Safety maintained. Bed in lowest position and locked. Daughter @ bedside. Call light in reach.

## 2019-01-10 LAB
ANION GAP SERPL CALC-SCNC: 7 MMOL/L
BUN SERPL-MCNC: 8 MG/DL
CALCIUM SERPL-MCNC: 8.1 MG/DL
CHLORIDE SERPL-SCNC: 109 MMOL/L
CO2 SERPL-SCNC: 24 MMOL/L
CREAT SERPL-MCNC: 0.8 MG/DL
EST. GFR  (AFRICAN AMERICAN): >60 ML/MIN/1.73 M^2
EST. GFR  (NON AFRICAN AMERICAN): >60 ML/MIN/1.73 M^2
GLUCOSE SERPL-MCNC: 86 MG/DL
MAGNESIUM SERPL-MCNC: 1.8 MG/DL
PHOSPHATE SERPL-MCNC: 3.2 MG/DL
POTASSIUM SERPL-SCNC: 3.4 MMOL/L
SODIUM SERPL-SCNC: 140 MMOL/L

## 2019-01-10 PROCEDURE — 11000001 HC ACUTE MED/SURG PRIVATE ROOM

## 2019-01-10 PROCEDURE — 99232 PR SUBSEQUENT HOSPITAL CARE,LEVL II: ICD-10-PCS | Mod: ,,, | Performed by: INTERNAL MEDICINE

## 2019-01-10 PROCEDURE — 36415 COLL VENOUS BLD VENIPUNCTURE: CPT

## 2019-01-10 PROCEDURE — 80048 BASIC METABOLIC PNL TOTAL CA: CPT

## 2019-01-10 PROCEDURE — 99232 SBSQ HOSP IP/OBS MODERATE 35: CPT | Mod: ,,, | Performed by: INTERNAL MEDICINE

## 2019-01-10 PROCEDURE — 63600175 PHARM REV CODE 636 W HCPCS: Performed by: NURSE PRACTITIONER

## 2019-01-10 PROCEDURE — 84100 ASSAY OF PHOSPHORUS: CPT

## 2019-01-10 PROCEDURE — S0030 INJECTION, METRONIDAZOLE: HCPCS | Performed by: NURSE PRACTITIONER

## 2019-01-10 PROCEDURE — 25000003 PHARM REV CODE 250: Performed by: NURSE PRACTITIONER

## 2019-01-10 PROCEDURE — 25000003 PHARM REV CODE 250: Performed by: HOSPITALIST

## 2019-01-10 PROCEDURE — 83735 ASSAY OF MAGNESIUM: CPT

## 2019-01-10 PROCEDURE — 94761 N-INVAS EAR/PLS OXIMETRY MLT: CPT

## 2019-01-10 PROCEDURE — 63600175 PHARM REV CODE 636 W HCPCS: Performed by: HOSPITALIST

## 2019-01-10 PROCEDURE — 25000003 PHARM REV CODE 250: Performed by: INTERNAL MEDICINE

## 2019-01-10 RX ORDER — POTASSIUM CHLORIDE 20 MEQ/15ML
40 SOLUTION ORAL ONCE
Status: COMPLETED | OUTPATIENT
Start: 2019-01-10 | End: 2019-01-10

## 2019-01-10 RX ADMIN — SODIUM CHLORIDE AND POTASSIUM CHLORIDE: .9; .15 SOLUTION INTRAVENOUS at 06:01

## 2019-01-10 RX ADMIN — OXYBUTYNIN CHLORIDE 10 MG: 5 TABLET, EXTENDED RELEASE ORAL at 09:01

## 2019-01-10 RX ADMIN — NIFEDIPINE 30 MG: 30 TABLET, FILM COATED, EXTENDED RELEASE ORAL at 09:01

## 2019-01-10 RX ADMIN — ONDANSETRON 8 MG: 8 TABLET, ORALLY DISINTEGRATING ORAL at 04:01

## 2019-01-10 RX ADMIN — PANTOPRAZOLE SODIUM 40 MG: 40 TABLET, DELAYED RELEASE ORAL at 09:01

## 2019-01-10 RX ADMIN — FAMOTIDINE 20 MG: 20 TABLET ORAL at 09:01

## 2019-01-10 RX ADMIN — METRONIDAZOLE 500 MG: 500 INJECTION, SOLUTION INTRAVENOUS at 05:01

## 2019-01-10 RX ADMIN — APIXABAN 5 MG: 2.5 TABLET, FILM COATED ORAL at 09:01

## 2019-01-10 RX ADMIN — SODIUM CHLORIDE AND POTASSIUM CHLORIDE: .9; .15 SOLUTION INTRAVENOUS at 05:01

## 2019-01-10 RX ADMIN — POTASSIUM CHLORIDE 40 MEQ: 40 SOLUTION ORAL at 02:01

## 2019-01-10 RX ADMIN — HYDROCODONE BITARTRATE AND ACETAMINOPHEN 1 TABLET: 7.5; 325 TABLET ORAL at 09:01

## 2019-01-10 RX ADMIN — CIPROFLOXACIN 400 MG: 2 INJECTION, SOLUTION INTRAVENOUS at 09:01

## 2019-01-10 NOTE — PROGRESS NOTES
Gastroenterology Progress Note    Reason for Consult: cecal colitis, partial SBO    Subjective:  No issues overnight.  NG tube remained clamped and she tolerated clear liquids throughout the day yesterday.  She is  Still requiring pain medication, but for the NG tube discomfort, not abdominal pain.  She continues to pass gas but has not had a bowel movement.  2 sons are here visiting.    ROS:  Gen: no F/C  CV: no CP/palpitations  Resp: no SOB/wheezing    Physical Exam  Vitals:    01/10/19 0737   BP: 132/68   Pulse: 105   Resp: 16   Temp: 98.1 °F (36.7 °C)     Physical Exam  Gen: well developed, well nourished, no distress  HEENT: NG tube in place - clamped  CV: tachycardic, regular rhythm  Pulm: CTAB  Abd: soft, mild distension, no guarding or rebound  MSK: no C/C/E    Medications/Infusions:  Current Facility-Administered Medications   Medication Dose Route Frequency Provider Last Rate Last Dose    0/9% NACL & POTASSIUM CHLORIDE 20 MEQ/L 1,000 mL infusion   Intravenous Continuous Maria Del Carmen Wild  mL/hr at 01/10/19 0643      acetaminophen tablet 650 mg  650 mg Oral Q4H PRN Aquilino Ludwig NP        apixaban tablet 5 mg  5 mg Oral BID Aquilino Ludwig NP   5 mg at 01/09/19 2111    benzocaine 20 % mouth spray   Mouth/Throat QID PRN Maria Del Carmen Wild MD        ciprofloxacin (CIPRO)400mg/200ml D5W IVPB 400 mg  400 mg Intravenous Q12H Aquilino Ludwig  mL/hr at 01/09/19 2114 400 mg at 01/09/19 2114    famotidine tablet 20 mg  20 mg Oral BID Aquilino Ludwig NP   20 mg at 01/09/19 2111    HYDROcodone-acetaminophen 7.5-325 mg per tablet 1 tablet  1 tablet Oral Q6H PRN Maria Del Carmen Wild MD   1 tablet at 01/09/19 2111    losartan tablet 100 mg  100 mg Oral Daily Aquilino Ludwig NP   100 mg at 01/08/19 0917    metronidazole IVPB 500 mg  500 mg Intravenous Q8H Aquilino Ludwig  mL/hr at 01/10/19 0548 500 mg at 01/10/19 0548    NIFEdipine 24 hr tablet 30 mg  30 mg Oral Daily Aquilino KELLER  NORY Ludwig   30 mg at 01/09/19 0855    ondansetron disintegrating tablet 8 mg  8 mg Oral Q8H PRN Aquilino Ludwig NP   8 mg at 01/08/19 2145    oxybutynin 24 hr tablet 10 mg  10 mg Oral Daily Aquilino Ludwig NP   10 mg at 01/09/19 0855    pantoprazole EC tablet 40 mg  40 mg Oral Daily Aquilino Ludwig NP   40 mg at 01/09/19 0855    predniSONE tablet 1 mg  1 mg Oral Daily Aquilino Ludwig NP   1 mg at 01/08/19 0917    sodium chloride 0.9% flush 5 mL  5 mL Intravenous PRN Aquilino Ludwig NP        traMADol tablet 50 mg  50 mg Oral Q4H PRN Maria Del Carmen Wild MD   50 mg at 01/09/19 1744       Intake and Output:    Intake/Output Summary (Last 24 hours) at 1/10/2019 0805  Last data filed at 1/10/2019 0700  Gross per 24 hour   Intake 960 ml   Output --   Net 960 ml       Labs:  Lab Results   Component Value Date/Time    WBC 8.61 01/08/2019 04:53 AM    HGB 13.2 01/08/2019 04:53 AM    HCT 40.2 01/08/2019 04:53 AM     01/08/2019 04:53 AM    MCV 92 01/08/2019 04:53 AM     01/10/2019 06:20 AM    K 3.4 (L) 01/10/2019 06:20 AM     01/10/2019 06:20 AM    CO2 24 01/10/2019 06:20 AM    BUN 8 01/10/2019 06:20 AM    CREATININE 0.8 01/10/2019 06:20 AM    GLU 86 01/10/2019 06:20 AM    CALCIUM 8.1 (L) 01/10/2019 06:20 AM    MG 1.8 01/10/2019 06:20 AM    PHOS 3.2 01/10/2019 06:20 AM    INR 1.0 01/03/2019 04:28 PM    APTT 26.6 04/03/2011 04:25 AM   ]  Lab Results   Component Value Date/Time    PROT 5.5 (L) 01/08/2019 04:53 AM    ALBUMIN 2.9 (L) 01/08/2019 04:53 AM    BILITOT 0.8 01/08/2019 04:53 AM    BILIDIR 0.3 03/22/2011 02:22 PM    AST 19 01/08/2019 04:53 AM    ALT 8 (L) 01/08/2019 04:53 AM    ALKPHOS 73 01/08/2019 04:53 AM   ]    Imaging and Procedures:  Labs and imaging results were reviewed.  None new/pertinent    Assessment:  Lakshmi Juarez is a 83 y.o. female with a history of colon cancer s/p resection who presented with abdominal pain, N/V and was found to have cecal colitis and a  partial SBO.     Plan:  - NG tube can come out today  - continue Boost Breeze  - encouraged ambulation  - keep K around 4 (hypokalemic currently) and Mg around 2  - can continue cipro/flagyl x 1 week for possible infectious colitis involving cecum  - once bowel movements resume, can add gentle laxatives (miralax 17g daily) to help get her bowels moving again  - plan of care was discussed with Dr. Osmin Trevino MD

## 2019-01-10 NOTE — ASSESSMENT & PLAN NOTE
- Tolerating clear liquids but remains without BM to date; still with flatus.  - Discontinue NGT.  - Completed 7-day course of ciprofloxacin IV, metronidazole IV.  - Appreciate GI, general surgery assistance.

## 2019-01-10 NOTE — PROGRESS NOTES
[facility] Hospital Medicine  Progress Note    Patient Name: Lakshmi Juarez  MRN: [id number]  Patient Class: IP- Inpatient   Admission Date: 1/3/2019  Length of Stay: 7 days  Attending Physician: STEPH Solorio MD  Primary Care Provider: Bisi Rey MD        Subjective:     Principal Problem:Acute colitis    HPI:  The patient is a 83 y.o. female with hx of HTN and colon CA who presents with complaint of abdominal pain since yesterday evening. She states it started to the back, but moved to the mid abdomen. There is associated nausea, diarrhea, and abdominal distension. She had two episodes of diarrhea. Last bowel movement was about 4 hours ago. She also reports lack of appetite. Pt is able to pass gas. Pt was seen at urgent care and abdominal x-ray at that time revealed probably bowel obstruction. Pt has PShx of total abdominal hysterectomy with bilateral salpingoophorectomy, partial colon resection, and cardiac valve replacement. She may have had an appendectomy, but is unsure. She denies any fever, chills, vomiting, chest pain, SOB, lightheadedness, and weakness. No urinary sx.    Hospital Course:  CT Abd/Pelvis demonstrated marked bowel wall thickening of cecum with associated fat stranding and she was subsequently started on IV ciprofloxacin and metronidazole. GI was consulted and based on colonoscopy from 2016 did not recommend endoscopy currently. She developed abdominal distention, nausea, and vomiting and abdominal X-ray demonstrated distended bowel loops consistent with ileus/SBO. NGT was placed and put to suction initially and general surgery was consulted, recommending conservative medical management. She completed 7 days of antibiotic therapy which was subsequently discontinued, and NGT removed on 01/10 with tolerance of clear liquids.    Interval History: No acute events overnight. Feeling well this morning. No other concerns at this time.    Review of Systems    Constitutional: Negative for chills and fever.   Respiratory: Negative for cough and shortness of breath.    Cardiovascular: Negative for chest pain and palpitations.   Gastrointestinal: Negative for abdominal pain, nausea and vomiting.     Objective:     Vital Signs (Most Recent):  Temp: 97.4 °F (36.3 °C) (01/10/19 1137)  Pulse: 105 (01/10/19 1137)  Resp: 18 (01/10/19 1137)  BP: 138/61 (01/10/19 1137)  SpO2: 95 % (01/10/19 1137) Vital Signs (24h Range):  Temp:  [97.4 °F (36.3 °C)-98.4 °F (36.9 °C)] 97.4 °F (36.3 °C)  Pulse:  [102-113] 105  Resp:  [16-18] 18  SpO2:  [92 %-95 %] 95 %  BP: ()/(53-69) 138/61     Weight: 68.1 kg (150 lb 2.1 oz)  Body mass index is 23.51 kg/m².    Intake/Output Summary (Last 24 hours) at 1/10/2019 1353  Last data filed at 1/10/2019 0700  Gross per 24 hour   Intake 960 ml   Output --   Net 960 ml      Physical Exam   Constitutional: She is oriented to person, place, and time. She appears well-developed and well-nourished. No distress.   HENT:   Head: Normocephalic and atraumatic.   Eyes: Conjunctivae and EOM are normal.   Cardiovascular: Regular rhythm, S1 normal, S2 normal and intact distal pulses. Tachycardia present.   Pulmonary/Chest: Effort normal and breath sounds normal.   Abdominal: Soft. She exhibits distension (mild). There is no tenderness.   Musculoskeletal: Normal range of motion. She exhibits no edema.   Neurological: She is alert and oriented to person, place, and time.   Skin: Skin is warm and dry.   Nursing note and vitals reviewed.    Significant Labs:   BMP:  Recent Labs   Lab 01/09/19  0438 01/10/19  0620    140   K 3.5 3.4*    109   CO2 23 24   BUN 7* 8   CREATININE 0.7 0.8   GLU 94 86   CALCIUM 8.5* 8.1*   MG 1.9 1.8   PHOS 3.0 3.2     Significant Imaging:   No new imaging this morning.    Assessment/Plan:      * Acute colitis    - Tolerating clear liquids but remains without BM to date; still with flatus.  - Discontinue NGT.  - Completed 7-day  course of ciprofloxacin IV, metronidazole IV.  - Appreciate GI, general surgery assistance.     Epistaxis    - h/o epistaxis prior.  - On aspirin at home with h/o TIA for stroke prophylaxis, apixaban as above.     Hypokalemia    - Replete PRN.     GERD (gastroesophageal reflux disease)    - Continuing pantoprazole 40mg PO daily.     Essential hypertension    - Continuing losartan 100mg PO daily, nifedipine 30mg PO daily.       VTE Risk Mitigation (From admission, onward)        Ordered     apixaban tablet 5 mg  2 times daily      01/03/19 2228     IP VTE HIGH RISK PATIENT  Once      01/03/19 2228     Reason for No Pharmacological VTE Prophylaxis  Once      01/03/19 2228     Place sequential compression device  Until discontinued      01/03/19 2059        STEPH Chandra MD  Department of Hospital Medicine   [facility]  [phone]

## 2019-01-10 NOTE — PLAN OF CARE
Problem: Adult Inpatient Plan of Care  Goal: Plan of Care Review  Outcome: Ongoing (interventions implemented as appropriate)  Patient remains free from injury or falls. Vital signs stable throughout night on room air.  Patient ambulated in the hallway numerous times.NG Tube clamped off. Positions self independently. Voiding adequately through shift. Pain managed with PO medications.Purposeful rounding performed.  Bed in low locked position and call light within reach. Will continue to monitor.

## 2019-01-10 NOTE — SUBJECTIVE & OBJECTIVE
Interval History: No acute events overnight. Feeling well this morning. No other concerns at this time.    Review of Systems   Constitutional: Negative for chills and fever.   Respiratory: Negative for cough and shortness of breath.    Cardiovascular: Negative for chest pain and palpitations.   Gastrointestinal: Negative for abdominal pain, nausea and vomiting.     Objective:     Vital Signs (Most Recent):  Temp: 97.4 °F (36.3 °C) (01/10/19 1137)  Pulse: 105 (01/10/19 1137)  Resp: 18 (01/10/19 1137)  BP: 138/61 (01/10/19 1137)  SpO2: 95 % (01/10/19 1137) Vital Signs (24h Range):  Temp:  [97.4 °F (36.3 °C)-98.4 °F (36.9 °C)] 97.4 °F (36.3 °C)  Pulse:  [102-113] 105  Resp:  [16-18] 18  SpO2:  [92 %-95 %] 95 %  BP: ()/(53-69) 138/61     Weight: 68.1 kg (150 lb 2.1 oz)  Body mass index is 23.51 kg/m².    Intake/Output Summary (Last 24 hours) at 1/10/2019 1353  Last data filed at 1/10/2019 0700  Gross per 24 hour   Intake 960 ml   Output --   Net 960 ml      Physical Exam   Constitutional: She is oriented to person, place, and time. She appears well-developed and well-nourished. No distress.   HENT:   Head: Normocephalic and atraumatic.   Eyes: Conjunctivae and EOM are normal.   Cardiovascular: Regular rhythm, S1 normal, S2 normal and intact distal pulses. Tachycardia present.   Pulmonary/Chest: Effort normal and breath sounds normal.   Abdominal: Soft. She exhibits distension (mild). There is no tenderness.   Musculoskeletal: Normal range of motion. She exhibits no edema.   Neurological: She is alert and oriented to person, place, and time.   Skin: Skin is warm and dry.   Nursing note and vitals reviewed.    Significant Labs:   BMP:  Recent Labs   Lab 01/09/19  0438 01/10/19  0620    140   K 3.5 3.4*    109   CO2 23 24   BUN 7* 8   CREATININE 0.7 0.8   GLU 94 86   CALCIUM 8.5* 8.1*   MG 1.9 1.8   PHOS 3.0 3.2     Significant Imaging:   No new imaging this morning.

## 2019-01-11 VITALS
SYSTOLIC BLOOD PRESSURE: 132 MMHG | BODY MASS INDEX: 23.56 KG/M2 | TEMPERATURE: 98 F | RESPIRATION RATE: 18 BRPM | HEIGHT: 67 IN | HEART RATE: 110 BPM | WEIGHT: 150.13 LBS | OXYGEN SATURATION: 99 % | DIASTOLIC BLOOD PRESSURE: 72 MMHG

## 2019-01-11 LAB
ANION GAP SERPL CALC-SCNC: 7 MMOL/L
BASOPHILS # BLD AUTO: 0.07 K/UL
BASOPHILS NFR BLD: 1 %
BUN SERPL-MCNC: 5 MG/DL
CALCIUM SERPL-MCNC: 8.4 MG/DL
CHLORIDE SERPL-SCNC: 112 MMOL/L
CO2 SERPL-SCNC: 22 MMOL/L
CREAT SERPL-MCNC: 0.7 MG/DL
DIFFERENTIAL METHOD: ABNORMAL
EOSINOPHIL # BLD AUTO: 0.2 K/UL
EOSINOPHIL NFR BLD: 2.2 %
ERYTHROCYTE [DISTWIDTH] IN BLOOD BY AUTOMATED COUNT: 15.1 %
EST. GFR  (AFRICAN AMERICAN): >60 ML/MIN/1.73 M^2
EST. GFR  (NON AFRICAN AMERICAN): >60 ML/MIN/1.73 M^2
GLUCOSE SERPL-MCNC: 91 MG/DL
HCT VFR BLD AUTO: 38.7 %
HGB BLD-MCNC: 12.7 G/DL
LYMPHOCYTES # BLD AUTO: 1.6 K/UL
LYMPHOCYTES NFR BLD: 22.7 %
MAGNESIUM SERPL-MCNC: 1.8 MG/DL
MCH RBC QN AUTO: 30.3 PG
MCHC RBC AUTO-ENTMCNC: 32.8 G/DL
MCV RBC AUTO: 92 FL
MONOCYTES # BLD AUTO: 0.7 K/UL
MONOCYTES NFR BLD: 10.2 %
NEUTROPHILS # BLD AUTO: 4.5 K/UL
NEUTROPHILS NFR BLD: 63.1 %
PHOSPHATE SERPL-MCNC: 2.8 MG/DL
PLATELET # BLD AUTO: 346 K/UL
PMV BLD AUTO: 10.1 FL
POTASSIUM SERPL-SCNC: 3.6 MMOL/L
RBC # BLD AUTO: 4.19 M/UL
SODIUM SERPL-SCNC: 141 MMOL/L
WBC # BLD AUTO: 7.19 K/UL

## 2019-01-11 PROCEDURE — 25000003 PHARM REV CODE 250: Performed by: NURSE PRACTITIONER

## 2019-01-11 PROCEDURE — 80048 BASIC METABOLIC PNL TOTAL CA: CPT

## 2019-01-11 PROCEDURE — 83735 ASSAY OF MAGNESIUM: CPT

## 2019-01-11 PROCEDURE — 99239 HOSP IP/OBS DSCHRG MGMT >30: CPT | Mod: ,,, | Performed by: INTERNAL MEDICINE

## 2019-01-11 PROCEDURE — 84100 ASSAY OF PHOSPHORUS: CPT

## 2019-01-11 PROCEDURE — 99239 PR HOSPITAL DISCHARGE DAY,>30 MIN: ICD-10-PCS | Mod: ,,, | Performed by: INTERNAL MEDICINE

## 2019-01-11 PROCEDURE — 85025 COMPLETE CBC W/AUTO DIFF WBC: CPT

## 2019-01-11 PROCEDURE — 63600175 PHARM REV CODE 636 W HCPCS: Performed by: HOSPITALIST

## 2019-01-11 PROCEDURE — 36415 COLL VENOUS BLD VENIPUNCTURE: CPT

## 2019-01-11 RX ORDER — ONDANSETRON 8 MG/1
8 TABLET, ORALLY DISINTEGRATING ORAL EVERY 8 HOURS PRN
Qty: 10 TABLET | Refills: 0 | Status: SHIPPED | OUTPATIENT
Start: 2019-01-11 | End: 2023-05-29 | Stop reason: ALTCHOICE

## 2019-01-11 RX ORDER — POLYETHYLENE GLYCOL 3350 17 G/17G
17 POWDER, FOR SOLUTION ORAL DAILY
Status: DISCONTINUED | OUTPATIENT
Start: 2019-01-11 | End: 2019-01-11 | Stop reason: HOSPADM

## 2019-01-11 RX ORDER — POLYETHYLENE GLYCOL 3350 17 G/17G
17 POWDER, FOR SOLUTION ORAL DAILY
COMMUNITY
Start: 2019-01-11 | End: 2023-05-29 | Stop reason: ALTCHOICE

## 2019-01-11 RX ADMIN — APIXABAN 5 MG: 2.5 TABLET, FILM COATED ORAL at 11:01

## 2019-01-11 RX ADMIN — PANTOPRAZOLE SODIUM 40 MG: 40 TABLET, DELAYED RELEASE ORAL at 11:01

## 2019-01-11 RX ADMIN — FAMOTIDINE 20 MG: 20 TABLET ORAL at 11:01

## 2019-01-11 RX ADMIN — SODIUM CHLORIDE AND POTASSIUM CHLORIDE: .9; .15 SOLUTION INTRAVENOUS at 01:01

## 2019-01-11 RX ADMIN — LOSARTAN POTASSIUM 100 MG: 50 TABLET, FILM COATED ORAL at 11:01

## 2019-01-11 RX ADMIN — NIFEDIPINE 30 MG: 30 TABLET, FILM COATED, EXTENDED RELEASE ORAL at 11:01

## 2019-01-11 RX ADMIN — OXYBUTYNIN CHLORIDE 10 MG: 5 TABLET, EXTENDED RELEASE ORAL at 11:01

## 2019-01-11 NOTE — PLAN OF CARE
Problem: Pain Acute  Goal: Optimal Pain Control  Outcome: Ongoing (interventions implemented as appropriate)  Patient remained pain free this shift. No acute distress/discomfort observed. Ambulated to and from bathroom without assist. Makes needs/concerns known without incidence. Will continue to monitor.

## 2019-01-11 NOTE — PLAN OF CARE
Met with patient at bedside - she continued to deny any DC needs      01/11/19 1127   Final Note   Assessment Type Final Discharge Note   Anticipated Discharge Disposition Home   What phone number can be called within the next 1-3 days to see how you are doing after discharge? [phone]   Discharge plans and expectations educations in teach back method with documentation complete? Yes   Right Care Referral Info   Post Acute Recommendation No Care

## 2019-01-11 NOTE — PLAN OF CARE
10:05  Osmin PONCE notified of Requests for DC   All other Requests:Sower/Linen change ect addressed per nursing    1:58 PM  In agreement and eager for DC.  VU of DC instructions, paperwork and prescriptions passed. IV removed with cath tip intact, WNL- per RUSS Garcia.    To be DC home with family.  Will be escorted downstairs via  transport team once dressed and ready.   Free from falls or skin breakdown this hospital admission.

## 2019-01-11 NOTE — PROGRESS NOTES
avss  Tolerating full liqs  + bm  No N/V    PE  Abd--soft, no distention, no tenderness    Plan  Low residue diet

## 2019-01-11 NOTE — DISCHARGE INSTRUCTIONS
Diet and Lifestyle Tips for Irritable Bowel Syndrome (IBS)    Your healthcare professional may suggest some lifestyle changes to help control your IBS. Changing your diet and managing stress are two of the most important. Follow your healthcare providers instructions and try some of the suggestions below.  Change your diet  Your diet may be an important cause of IBS symptoms. You may want to try the following:  · Pay attention to what foods bother you, and avoid them. For example, dairy products are hard for some people to digest.  · Drink 6 to 8 glasses of water a day.  · Avoid caffeine and tobacco. These are muscle stimulants and can affect the working of your digestive tract.  · Avoid alcohol, which can irritate your digestive tract and make your symptoms worse.  · Eat more fiber if constipation is a problem. Fiber makes the stool softer and easier to pass through the colon.  Reduce stress  If stress or anxiety makes your IBS symptoms worse, learning how to manage stress may help you feel better. Try these tips:  · Identify the causes of stress in your life.  · Learn new ways to cope with them.  · Regular exercise is a great way to relieve stress. It can also help ease constipation.  Date Last Reviewed: 7/1/2016  © 6982-3686 [employer]. [address] [place] [address], [place] [address]. All rights reserved. This information is not intended as a substitute for professional medical care. Always follow your healthcare professional's instructions.

## 2019-01-11 NOTE — PROGRESS NOTES
Gastroenterology Progress Note    Reason for Consult: partial SBO    Subjective:  No issues overnight.  She is feeling much better now that the NG tube is out.  She denies abdominal pain, N/V.  She had more gas yesterday and a small amount of liquid stool.  No GI bleeding.  She has ordered a low residue diet for this morning.    ROS:  Gen: no F/C  CV: no CP/palpitations  Resp: no SOB/wheezing    Physical Exam  Vitals:    01/11/19 0358   BP: 132/72   Pulse: 110   Resp:    Temp: 97.9 °F (36.6 °C)     Physical Exam  Gen: well developed, well nourished, no distress  CV: tachycardic, regular rhythm  Pulm: CTAB  Abd: soft, mild distension, no guarding or rebound  MSK: no C/C/E    Medications/Infusions:  Current Facility-Administered Medications   Medication Dose Route Frequency Provider Last Rate Last Dose    0/9% NACL & POTASSIUM CHLORIDE 20 MEQ/L 1,000 mL infusion   Intravenous Continuous Maria Del Carmen Wild  mL/hr at 01/11/19 0159      acetaminophen tablet 650 mg  650 mg Oral Q4H PRN Aquilino Ludwig NP        apixaban tablet 5 mg  5 mg Oral BID Aquilino Ludwig NP   5 mg at 01/10/19 2147    benzocaine 20 % mouth spray   Mouth/Throat QID PRN Maria Del Carmen Wild MD        famotidine tablet 20 mg  20 mg Oral BID Aquilino Ludwig NP   20 mg at 01/10/19 2147    HYDROcodone-acetaminophen 7.5-325 mg per tablet 1 tablet  1 tablet Oral Q6H PRN Maria Del Carmen iWld MD   1 tablet at 01/10/19 0915    losartan tablet 100 mg  100 mg Oral Daily Aquilino Ludwig NP   100 mg at 01/08/19 0917    NIFEdipine 24 hr tablet 30 mg  30 mg Oral Daily Aquilino Ludwig NP   30 mg at 01/10/19 0915    ondansetron disintegrating tablet 8 mg  8 mg Oral Q8H PRN Aquilino Ludwig NP   8 mg at 01/10/19 1608    oxybutynin 24 hr tablet 10 mg  10 mg Oral Daily Aquilino Ludwig NP   10 mg at 01/10/19 0914    pantoprazole EC tablet 40 mg  40 mg Oral Daily Aquilino Ludwig, NORY   40 mg at 01/10/19 0915    predniSONE tablet 1 mg  1  mg Oral Daily Aquilino Ludwig NP   1 mg at 01/08/19 0917    sodium chloride 0.9% flush 5 mL  5 mL Intravenous PRN Aquilino Ludwig NP        traMADol tablet 50 mg  50 mg Oral Q4H PRN Maria Del Carmen Wild MD   50 mg at 01/09/19 1744       Intake and Output:    Intake/Output Summary (Last 24 hours) at 1/11/2019 0806  Last data filed at 1/10/2019 1724  Gross per 24 hour   Intake 600 ml   Output --   Net 600 ml       Labs:  Lab Results   Component Value Date/Time    WBC 7.19 01/11/2019 05:32 AM    HGB 12.7 01/11/2019 05:32 AM    HCT 38.7 01/11/2019 05:32 AM     01/11/2019 05:32 AM    MCV 92 01/11/2019 05:32 AM     01/11/2019 05:32 AM    K 3.6 01/11/2019 05:32 AM     (H) 01/11/2019 05:32 AM    CO2 22 (L) 01/11/2019 05:32 AM    BUN 5 (L) 01/11/2019 05:32 AM    CREATININE 0.7 01/11/2019 05:32 AM    GLU 91 01/11/2019 05:32 AM    CALCIUM 8.4 (L) 01/11/2019 05:32 AM    MG 1.8 01/11/2019 05:32 AM    PHOS 2.8 01/11/2019 05:32 AM    INR 1.0 01/03/2019 04:28 PM    APTT 26.6 04/03/2011 04:25 AM   ]  Lab Results   Component Value Date/Time    PROT 5.5 (L) 01/08/2019 04:53 AM    ALBUMIN 2.9 (L) 01/08/2019 04:53 AM    BILITOT 0.8 01/08/2019 04:53 AM    BILIDIR 0.3 03/22/2011 02:22 PM    AST 19 01/08/2019 04:53 AM    ALT 8 (L) 01/08/2019 04:53 AM    ALKPHOS 73 01/08/2019 04:53 AM   ]    Imaging and Procedures:  Labs and imaging results were reviewed.  None new    Assessment:  Lakshmi Juarez is a 83 y.o. female with a history of  colon cancer s/p resection who presented with abdominal pain, N/V and was found to have cecal colitis and a partial SBO.     Plan:  - agree with low residue diet  - continue Boost Breeze  - encouraged ambulation  - keep K around 4 and Mg around 2  - can continue cipro/flagyl x 1 week for possible infectious colitis involving cecum  - start miralax 17 g daily today  - patient reporting some urinary urgency - had negative urine culture on admission and has been on cipro/flagyl, so  unlikely to have UTI, but will defer to primary team regarding repeating a UA; also is on oxybutynin at home and in the hospital as this is a chronic issue (may also be worse now because of her bowel issues and will improve once she starts moving her bowels again)    Astrid Trevino MD

## 2019-01-12 NOTE — DISCHARGE SUMMARY
[facility] Hospital Medicine  Discharge Summary      Patient Name: Lakshmi Juarez  MRN: [id number]  Admission Date: 1/3/2019  Hospital Length of Stay: 8 days  Discharge Date and Time: 1/11/2019  2:26 PM  Attending Physician: No att. providers found   Discharging Provider: HAWK Solorio MD  Primary Care Provider: Bisi Rey MD      HPI:   The patient is a 83 y.o. female with hx of HTN and colon CA who presents with complaint of abdominal pain since yesterday evening. She states it started to the back, but moved to the mid abdomen. There is associated nausea, diarrhea, and abdominal distension. She had two episodes of diarrhea. Last bowel movement was about 4 hours ago. She also reports lack of appetite. Pt is able to pass gas. Pt was seen at urgent care and abdominal x-ray at that time revealed probably bowel obstruction. Pt has PShx of total abdominal hysterectomy with bilateral salpingoophorectomy, partial colon resection, and cardiac valve replacement. She may have had an appendectomy, but is unsure. She denies any fever, chills, vomiting, chest pain, SOB, lightheadedness, and weakness. No urinary sx.      Hospital Course:   CT Abd/Pelvis demonstrated marked bowel wall thickening of cecum with associated fat stranding and she was subsequently started on IV ciprofloxacin and metronidazole. GI was consulted and based on colonoscopy from 2016 did not recommend endoscopy currently. She developed abdominal distention, nausea, and vomiting and abdominal X-ray demonstrated distended bowel loops consistent with ileus/SBO. NGT was placed and put to suction initially and general surgery was consulted, recommending conservative medical management. She completed 7 days of antibiotic therapy which was subsequently discontinued, and NGT removed on 01/10 with tolerance of clear liquids. She had bowel movement, after which she was advanced to low residue diet and tolerated it well. She will be  discharged on stool softener with PCP follow-up in the near future.     Consults:   Consults (From admission, onward)        Status Ordering Provider     Inpatient consult to Gastroenterology  Once     Provider:  William Rodriguez MD    Completed HIMA CARROLL          No new Assessment & Plan notes have been filed under this hospital service since the last note was generated.  Service: Hospital Medicine    Final Active Diagnoses:    Diagnosis Date Noted POA    PRINCIPAL PROBLEM:  Acute colitis [K52.9] 01/03/2019 Yes    Hypokalemia [E87.6] 01/07/2019 Yes    Epistaxis [R04.0] 01/07/2019 Yes    Essential hypertension [I10] 08/31/2015 Yes    GERD (gastroesophageal reflux disease) [K21.9] 08/31/2015 Yes      Problems Resolved During this Admission:       Discharged Condition: good    Disposition: Home or Self Care    Follow Up:  Follow-up Information     Bisi Rey MD In 1 week.    Specialty:  Internal Medicine  Why:  post-hospitalization follow-up  Contact information:  [address] [place] [id number]  [phone]                 Patient Instructions:      Diet Adult Regular     Order Specific Question Answer Comments   Additional restrictions: Low Fiber      Notify your health care provider if you experience any of the following:  severe uncontrolled pain     Notify your health care provider if you experience any of the following:  persistent nausea and vomiting or diarrhea     Activity as tolerated       Significant Diagnostic Studies:   Recent Results (from the past 24 hour(s))   Magnesium    Collection Time: 01/11/19  5:32 AM   Result Value Ref Range    Magnesium 1.8 1.6 - 2.6 mg/dL   Phosphorus    Collection Time: 01/11/19  5:32 AM   Result Value Ref Range    Phosphorus 2.8 2.7 - 4.5 mg/dL   Basic metabolic panel    Collection Time: 01/11/19  5:32 AM   Result Value Ref Range    Sodium 141 136 - 145 mmol/L    Potassium 3.6 3.5 - 5.1 mmol/L    Chloride 112 (H) 95 - 110 mmol/L     CO2 22 (L) 23 - 29 mmol/L    Glucose 91 70 - 110 mg/dL    BUN, Bld 5 (L) 8 - 23 mg/dL    Creatinine 0.7 0.5 - 1.4 mg/dL    Calcium 8.4 (L) 8.7 - 10.5 mg/dL    Anion Gap 7 (L) 8 - 16 mmol/L    eGFR if African American >60 >60 mL/min/1.73 m^2    eGFR if non African American >60 >60 mL/min/1.73 m^2   CBC with Automated Differential    Collection Time: 01/11/19  5:32 AM   Result Value Ref Range    WBC 7.19 3.90 - 12.70 K/uL    RBC 4.19 4.00 - 5.40 M/uL    Hemoglobin 12.7 12.0 - 16.0 g/dL    Hematocrit 38.7 37.0 - 48.5 %    MCV 92 82 - 98 fL    MCH 30.3 27.0 - 31.0 pg    MCHC 32.8 32.0 - 36.0 g/dL    RDW 15.1 (H) 11.5 - 14.5 %    Platelets 346 150 - 350 K/uL    MPV 10.1 9.2 - 12.9 fL    Gran # (ANC) 4.5 1.8 - 7.7 K/uL    Lymph # 1.6 1.0 - 4.8 K/uL    Mono # 0.7 0.3 - 1.0 K/uL    Eos # 0.2 0.0 - 0.5 K/uL    Baso # 0.07 0.00 - 0.20 K/uL    Gran% 63.1 38.0 - 73.0 %    Lymph% 22.7 18.0 - 48.0 %    Mono% 10.2 4.0 - 15.0 %    Eosinophil% 2.2 0.0 - 8.0 %    Basophil% 1.0 0.0 - 1.9 %    Differential Method Automated      Pending Diagnostic Studies:     None         Medications:  Reconciled Home Medications:      Medication List      START taking these medications    ondansetron 8 MG Tbdl  Commonly known as:  ZOFRAN-ODT  Take 1 tablet (8 mg total) by mouth every 8 (eight) hours as needed (Nausea/Vomiting).     polyethylene glycol 17 gram Pwpk  Commonly known as:  GLYCOLAX  Take 17 g by mouth once daily.        CONTINUE taking these medications    aspirin 81 MG EC tablet  Commonly known as:  ECOTRIN  Take 81 mg by mouth once daily.     DITROPAN XL 10 MG 24 hr tablet  Generic drug:  oxybutynin  Take 10 mg by mouth once daily.     DYMISTA 137-50 mcg/spray Spry nassal spray  Generic drug:  azelastine-fluticasone  1 spray by Each Nare route 2 (two) times daily.     ELIQUIS 5 mg Tab  Generic drug:  apixaban  TAKE 1 TABLET BY MOUTH TWICE DAILY     losartan 100 MG tablet  Commonly known as:  COZAAR  Take 100 mg by mouth once daily.      NIFEdipine 30 MG (OSM) 24 hr tablet  Commonly known as:  PROCARDIA-XL  TAKE 1 TABLET(30 MG) BY MOUTH EVERY DAY     pantoprazole 40 MG tablet  Commonly known as:  PROTONIX  Take 40 mg by mouth once daily.     SUDAFED ORAL  Take 1 tablet by mouth once daily.            Indwelling Lines/Drains at time of discharge:   Lines/Drains/Airways          None          Time spent on the discharge of patient: 35 minutes  Patient was seen and examined on the date of discharge and determined to be suitable for discharge.         HAWK Solorio MD  Department of Hospital Medicine  [facility]

## 2019-01-14 ENCOUNTER — PATIENT OUTREACH (OUTPATIENT)
Dept: ADMINISTRATIVE | Facility: CLINIC | Age: 84
End: 2019-01-14

## 2019-01-28 NOTE — PHYSICIAN QUERY
PT Name: Lakshmi Juarez  MR #: [id number]    Physician Query Form - Relationship to Procedure Clarification     CDS/: Doris Alaniz RN               Contact information: [email or web address]    This form is a permanent document in the medical record.     Query Date: January 28, 2019      By submitting this query, we are merely seeking further clarification of documentation. Please utilize your independent clinical judgment when addressing the question(s) below.    The Medical record contains the following:  Supporting Clinical Findings Location in Medical Record    2100 NGT attempted to right nare and unable to tolerate. Bleeding small amount from nare. Patient then verbalized to nurse after removal  that she was admitted 6 weeks ago for a nose bleed to he right nare.    2115 #14 Portuguese NGT inserted to left nare without difficulty.patient wiping scant amounts of blood tinged mucous from tongue. Denies frequent swallowing.    The patient had some mild epistaxis with NG tube in place.  With tube clamped bowel sounds are absent.    Monitor closely as the patient has a history of epistaxis     Pt c/o epistaxis and pain from the NGT. Her abd pain has resolved. She has had little output from her NGT    No issues overnight.  NG tube remained clamped and she tolerated clear liquids throughout the day yesterday.  She is  Still requiring pain medication, but for the NG tube discomfort, not abdominal pain.      Acute colitis, epistaxis    H&H= 14.2/42.8  H&H= 12.1/37.2  H&H= 13.2/40.2  H&H= 12.7/38.7 Nursing note 1/7            Hospital Medicine note 1/7    Gastroenterology PN 1/7    Gastroenterology PN  1/8      Gastroenterology PN 1/10        Discharge summary    Labs 1/3  Labs 1/4  Labs 1/8  Labs 1/11       Please clarify if __Epistaxis___ is      [  ] Inherent/Integral to procedure   [  ] Present, but not a complication of the procedure   [  ] Incidental finding, not clinically significant   [ x ]  Complication of procedure   [  ] Other (please specify): __________________   [  ] Clinically Undetermined

## 2021-01-28 ENCOUNTER — CLINICAL SUPPORT (OUTPATIENT)
Dept: URGENT CARE | Facility: CLINIC | Age: 86
End: 2021-01-28
Payer: MEDICARE

## 2021-01-28 DIAGNOSIS — Z11.9 SCREENING EXAMINATION FOR UNSPECIFIED INFECTIOUS DISEASE: Primary | ICD-10-CM

## 2021-01-28 LAB
CTP QC/QA: YES
SARS-COV-2 RDRP RESP QL NAA+PROBE: NEGATIVE

## 2021-01-28 PROCEDURE — U0002 COVID-19 LAB TEST NON-CDC: HCPCS | Mod: QW,CR,S$GLB, | Performed by: NURSE PRACTITIONER

## 2021-01-28 PROCEDURE — 99211 OFF/OP EST MAY X REQ PHY/QHP: CPT | Mod: S$GLB,CS,, | Performed by: NURSE PRACTITIONER

## 2021-01-28 PROCEDURE — U0002: ICD-10-PCS | Mod: QW,CR,S$GLB, | Performed by: NURSE PRACTITIONER

## 2021-01-28 PROCEDURE — 99211 PR OFFICE/OUTPT VISIT, EST, LEVL I: ICD-10-PCS | Mod: S$GLB,CS,, | Performed by: NURSE PRACTITIONER

## 2021-04-26 ENCOUNTER — PATIENT MESSAGE (OUTPATIENT)
Dept: RESEARCH | Facility: HOSPITAL | Age: 86
End: 2021-04-26

## 2022-12-30 ENCOUNTER — OFFICE VISIT (OUTPATIENT)
Dept: URGENT CARE | Facility: CLINIC | Age: 87
End: 2022-12-30
Payer: MEDICARE

## 2022-12-30 VITALS
OXYGEN SATURATION: 94 % | SYSTOLIC BLOOD PRESSURE: 123 MMHG | HEIGHT: 67 IN | BODY MASS INDEX: 23.54 KG/M2 | TEMPERATURE: 99 F | DIASTOLIC BLOOD PRESSURE: 62 MMHG | HEART RATE: 92 BPM | WEIGHT: 150 LBS

## 2022-12-30 DIAGNOSIS — J40 BRONCHITIS: Primary | ICD-10-CM

## 2022-12-30 DIAGNOSIS — R52 COMPLAINTS OF TOTAL BODY PAIN: ICD-10-CM

## 2022-12-30 LAB
CTP QC/QA: YES
SARS-COV-2 RDRP RESP QL NAA+PROBE: NEGATIVE

## 2022-12-30 PROCEDURE — 99214 PR OFFICE/OUTPT VISIT, EST, LEVL IV, 30-39 MIN: ICD-10-PCS | Mod: S$GLB,,, | Performed by: SURGERY

## 2022-12-30 PROCEDURE — 71046 X-RAY EXAM CHEST 2 VIEWS: CPT | Mod: S$GLB,,, | Performed by: RADIOLOGY

## 2022-12-30 PROCEDURE — 87635: ICD-10-PCS | Mod: QW,CR,S$GLB, | Performed by: SURGERY

## 2022-12-30 PROCEDURE — 99214 OFFICE O/P EST MOD 30 MIN: CPT | Mod: S$GLB,,, | Performed by: SURGERY

## 2022-12-30 PROCEDURE — 87635 SARS-COV-2 COVID-19 AMP PRB: CPT | Mod: QW,CR,S$GLB, | Performed by: SURGERY

## 2022-12-30 PROCEDURE — 71046 XR CHEST PA AND LATERAL: ICD-10-PCS | Mod: S$GLB,,, | Performed by: RADIOLOGY

## 2022-12-30 NOTE — PROGRESS NOTES
"Subjective:       Patient ID: Lakshmi Juarez is a 87 y.o. female.    Vitals:  height is 5' 7" (1.702 m) and weight is 68 kg (150 lb). Her oral temperature is 99.1 °F (37.3 °C). Her blood pressure is 123/62 and her pulse is 92. Her oxygen saturation is 94% (abnormal).     Chief Complaint: Cough    Pt states that she is coming in for sore throat and a cough also runny nose body pain. Productive cough with wheezing worse at night. Her primary care sent in an antibiotic she hasn't picked up yet. She was advised to come for a covid test  Pt syms started Tuesday nighty   Pt self treated with day and night time meds   Pt pain level is a 6     Cough  This is a new problem. Episode onset: 3 days ago. The problem has been unchanged. The problem occurs constantly. The cough is Non-productive. Associated symptoms include headaches, postnasal drip and a sore throat. Nothing aggravates the symptoms. She has tried OTC cough suppressant for the symptoms. The treatment provided no relief.     HENT:  Positive for postnasal drip and sore throat.    Respiratory:  Positive for cough.    Neurological:  Positive for headaches.     Objective:      Physical Exam   Constitutional: She is oriented to person, place, and time. She appears well-developed. She is cooperative.  Non-toxic appearance. She does not appear ill. No distress.   HENT:   Head: Normocephalic and atraumatic.   Ears:   Right Ear: Hearing, tympanic membrane, external ear and ear canal normal.   Left Ear: Hearing, tympanic membrane, external ear and ear canal normal.   Nose: Nose normal. No mucosal edema, rhinorrhea or nasal deformity. No epistaxis. Right sinus exhibits no maxillary sinus tenderness and no frontal sinus tenderness. Left sinus exhibits no maxillary sinus tenderness and no frontal sinus tenderness.   Mouth/Throat: Uvula is midline, oropharynx is clear and moist and mucous membranes are normal. No trismus in the jaw. Normal dentition. No uvula swelling. No " oropharyngeal exudate, posterior oropharyngeal edema or posterior oropharyngeal erythema.   Eyes: Conjunctivae and lids are normal. No scleral icterus.   Neck: Trachea normal and phonation normal. Neck supple. No edema present. No erythema present. No neck rigidity present.   Cardiovascular: Normal rate, regular rhythm, normal heart sounds and normal pulses.   Pulmonary/Chest: Effort normal. No respiratory distress. She has no decreased breath sounds. She has wheezes in the left middle field and the left lower field. She has rhonchi in the left middle field and the left lower field.   Frequent productive cough during exam, rhonchi does not clear with cough         Comments: Frequent productive cough during exam, rhonchi does not clear with cough    Abdominal: Normal appearance.   Musculoskeletal: Normal range of motion.         General: No deformity. Normal range of motion.   Neurological: She is alert and oriented to person, place, and time. She exhibits normal muscle tone. Coordination normal.   Skin: Skin is warm, dry, intact, not diaphoretic and not pale.   Psychiatric: Her speech is normal and behavior is normal. Judgment and thought content normal.   Nursing note and vitals reviewed.      Assessment:       1. Bronchitis    2. Complaints of total body pain          Plan:         Bronchitis    Complaints of total body pain  -     POCT COVID-19 Rapid Screening  -     X-Ray Chest PA And Lateral; Future; Expected date: 12/30/2022      Results for orders placed or performed in visit on 12/30/22   POCT COVID-19 Rapid Screening   Result Value Ref Range    POC Rapid COVID Negative Negative     Acceptable Yes        X-Ray Chest PA And Lateral    Result Date: 12/30/2022  EXAMINATION: XR CHEST PA AND LATERAL CLINICAL HISTORY: Pain, unspecified FINDINGS: White 0 S Two views chest: There is a pacer.  There is aortic plaque.  Heart size is normal.  Lungs are clear.     No acute process seen. Electronically  signed by: Sean Spencer MD Date:    12/30/2022 Time:    15:19

## 2023-05-29 ENCOUNTER — OFFICE VISIT (OUTPATIENT)
Dept: URGENT CARE | Facility: CLINIC | Age: 88
End: 2023-05-29
Payer: MEDICARE

## 2023-05-29 VITALS
DIASTOLIC BLOOD PRESSURE: 67 MMHG | HEART RATE: 84 BPM | RESPIRATION RATE: 16 BRPM | TEMPERATURE: 98 F | HEIGHT: 67 IN | OXYGEN SATURATION: 95 % | WEIGHT: 150 LBS | SYSTOLIC BLOOD PRESSURE: 122 MMHG | BODY MASS INDEX: 23.54 KG/M2

## 2023-05-29 DIAGNOSIS — J41.0 BRONCHITIS, CHRONIC, SIMPLE: Primary | ICD-10-CM

## 2023-05-29 DIAGNOSIS — R05.9 COUGH, UNSPECIFIED TYPE: ICD-10-CM

## 2023-05-29 PROBLEM — R53.83 FATIGUE: Status: ACTIVE | Noted: 2022-11-11

## 2023-05-29 PROBLEM — Z86.79 H/O ATRIAL FLUTTER: Status: ACTIVE | Noted: 2019-06-28

## 2023-05-29 PROBLEM — Z95.0 HISTORY OF PACEMAKER: Status: ACTIVE | Noted: 2019-06-28

## 2023-05-29 PROBLEM — D64.9 ANEMIA, UNSPECIFIED: Status: ACTIVE | Noted: 2017-10-01

## 2023-05-29 PROBLEM — R09.82 POSTNASAL DRIP: Status: ACTIVE | Noted: 2022-08-18

## 2023-05-29 PROBLEM — R68.2 DRY MOUTH: Status: ACTIVE | Noted: 2022-12-13

## 2023-05-29 PROBLEM — N39.41 URGE INCONTINENCE: Status: ACTIVE | Noted: 2017-10-01

## 2023-05-29 PROBLEM — G52.2: Status: ACTIVE | Noted: 2022-08-18

## 2023-05-29 LAB
CTP QC/QA: YES
SARS-COV-2 AG RESP QL IA.RAPID: NEGATIVE

## 2023-05-29 PROCEDURE — 87811 SARS-COV-2 COVID19 W/OPTIC: CPT | Mod: QW,S$GLB,, | Performed by: FAMILY MEDICINE

## 2023-05-29 PROCEDURE — 99214 OFFICE O/P EST MOD 30 MIN: CPT | Mod: S$GLB,,, | Performed by: FAMILY MEDICINE

## 2023-05-29 PROCEDURE — 99214 PR OFFICE/OUTPT VISIT, EST, LEVL IV, 30-39 MIN: ICD-10-PCS | Mod: S$GLB,,, | Performed by: FAMILY MEDICINE

## 2023-05-29 PROCEDURE — 87811 SARS CORONAVIRUS 2 ANTIGEN POCT, MANUAL READ: ICD-10-PCS | Mod: QW,S$GLB,, | Performed by: FAMILY MEDICINE

## 2023-05-29 RX ORDER — DOXYCYCLINE 100 MG/1
100 CAPSULE ORAL 2 TIMES DAILY
Qty: 14 CAPSULE | Refills: 0 | Status: SHIPPED | OUTPATIENT
Start: 2023-05-29 | End: 2023-06-05

## 2023-05-29 RX ORDER — GABAPENTIN 100 MG/1
100 CAPSULE ORAL 2 TIMES DAILY
COMMUNITY
Start: 2023-05-14

## 2023-05-29 RX ORDER — AZITHROMYCIN 250 MG/1
TABLET, FILM COATED ORAL
COMMUNITY
Start: 2023-02-27 | End: 2023-05-29 | Stop reason: ALTCHOICE

## 2023-05-29 RX ORDER — GUAIFENESIN 1200 MG/1
1 TABLET, EXTENDED RELEASE ORAL 2 TIMES DAILY
Qty: 20 TABLET | Refills: 1 | Status: SHIPPED | OUTPATIENT
Start: 2023-05-29 | End: 2023-05-29 | Stop reason: SDUPTHER

## 2023-05-29 RX ORDER — HYDROCODONE BITARTRATE AND HOMATROPINE METHYLBROMIDE ORAL SOLUTION 5; 1.5 MG/5ML; MG/5ML
5 LIQUID ORAL NIGHTLY PRN
Qty: 120 ML | Refills: 0 | Status: SHIPPED | OUTPATIENT
Start: 2023-05-29

## 2023-05-29 RX ORDER — AZELASTINE 1 MG/ML
1 SPRAY, METERED NASAL 2 TIMES DAILY
COMMUNITY
Start: 2023-03-07

## 2023-05-29 RX ORDER — DOXYCYCLINE 100 MG/1
100 CAPSULE ORAL 2 TIMES DAILY
Qty: 14 CAPSULE | Refills: 0 | Status: SHIPPED | OUTPATIENT
Start: 2023-05-29 | End: 2023-05-29 | Stop reason: SDUPTHER

## 2023-05-29 RX ORDER — PREDNISONE 20 MG/1
20 TABLET ORAL 2 TIMES DAILY
Qty: 10 TABLET | Refills: 0 | Status: SHIPPED | OUTPATIENT
Start: 2023-05-29 | End: 2023-06-03

## 2023-05-29 RX ORDER — SOTALOL HYDROCHLORIDE 80 MG/1
TABLET ORAL
COMMUNITY
Start: 2023-05-27

## 2023-05-29 RX ORDER — LEVOCETIRIZINE DIHYDROCHLORIDE 5 MG/1
5 TABLET, FILM COATED ORAL
COMMUNITY
Start: 2023-01-02 | End: 2023-05-29 | Stop reason: ALTCHOICE

## 2023-05-29 RX ORDER — GUAIFENESIN 1200 MG/1
1 TABLET, EXTENDED RELEASE ORAL 2 TIMES DAILY
Qty: 20 TABLET | Refills: 1 | Status: SHIPPED | OUTPATIENT
Start: 2023-05-29 | End: 2023-06-08

## 2023-05-29 RX ORDER — HYDROCODONE BITARTRATE AND HOMATROPINE METHYLBROMIDE ORAL SOLUTION 5; 1.5 MG/5ML; MG/5ML
5 LIQUID ORAL EVERY 4 HOURS PRN
Qty: 120 ML | Refills: 0 | Status: SHIPPED | OUTPATIENT
Start: 2023-05-29 | End: 2023-05-29 | Stop reason: SDUPTHER

## 2023-05-29 RX ORDER — PREDNISONE 20 MG/1
20 TABLET ORAL 2 TIMES DAILY
Qty: 10 TABLET | Refills: 0 | Status: SHIPPED | OUTPATIENT
Start: 2023-05-29 | End: 2023-05-29 | Stop reason: SDUPTHER

## 2023-05-29 RX ORDER — PROMETHAZINE HYDROCHLORIDE AND DEXTROMETHORPHAN HYDROBROMIDE 6.25; 15 MG/5ML; MG/5ML
5 SYRUP ORAL 4 TIMES DAILY PRN
COMMUNITY
Start: 2023-01-04

## 2023-05-29 NOTE — PROGRESS NOTES
"Subjective:      Patient ID: Lakshmi Juarez is a 87 y.o. female.    Vitals:  height is 5' 7" (1.702 m) and weight is 68 kg (150 lb). Her temperature is 98.2 °F (36.8 °C). Her blood pressure is 122/67 and her pulse is 84. Her respiration is 16 and oxygen saturation is 95%.     Chief Complaint: Cough    Pt presents complaints of a cough. Symptom started unknown. Pt states worse over the weekend. Constant.clear phlegm. Cough is in her chest. OTC dyquil and nyquil taken with no relief. She has had normal PFTs last year.     Cough  This is a recurrent problem. The problem has been gradually worsening. The problem occurs constantly. The cough is Productive of sputum. She has tried OTC cough suppressant for the symptoms. The treatment provided no relief. Her past medical history is significant for environmental allergies. There is no history of asthma, bronchiectasis, bronchitis, COPD, emphysema or pneumonia.   Respiratory:  Positive for cough.    Allergic/Immunologic: Positive for environmental allergies.    Objective:     Physical Exam   Constitutional: She is oriented to person, place, and time. She appears well-developed. She is cooperative.  Non-toxic appearance. She does not appear ill. No distress.   HENT:   Head: Normocephalic and atraumatic.   Ears:   Right Ear: Hearing, tympanic membrane, external ear and ear canal normal.   Left Ear: Hearing, tympanic membrane, external ear and ear canal normal.   Nose: Nose normal. No mucosal edema, rhinorrhea or nasal deformity. No epistaxis. Right sinus exhibits no maxillary sinus tenderness and no frontal sinus tenderness. Left sinus exhibits no maxillary sinus tenderness and no frontal sinus tenderness.   Mouth/Throat: Uvula is midline and mucous membranes are normal. Mucous membranes are moist. No trismus in the jaw. Normal dentition. No uvula swelling. Posterior oropharyngeal erythema (of uvula) present. No oropharyngeal exudate or posterior oropharyngeal edema. " Oropharynx is clear.   Eyes: Conjunctivae and lids are normal. No scleral icterus.   Neck: Trachea normal and phonation normal. Neck supple. No edema present. No erythema present. No neck rigidity present.   Cardiovascular: Normal rate, regular rhythm, normal heart sounds and normal pulses.   Pulmonary/Chest: Effort normal. No respiratory distress. She has no decreased breath sounds. She has rhonchi (harsh rhonchi anteriorly, no wheezing).   Abdominal: Normal appearance.   Musculoskeletal: Normal range of motion.         General: No deformity. Normal range of motion.   Lymphadenopathy:     She has no cervical adenopathy.   Neurological: She is alert and oriented to person, place, and time. She exhibits normal muscle tone. Coordination normal.   Skin: Skin is warm, dry, intact, not diaphoretic and not pale.   Psychiatric: Her speech is normal and behavior is normal. Judgment and thought content normal.   Nursing note and vitals reviewed.    Assessment:     1. Bronchitis, chronic, simple    2. Cough, unspecified type        Plan:       Bronchitis, chronic, simple  -     predniSONE (DELTASONE) 20 MG tablet; Take 1 tablet (20 mg total) by mouth 2 (two) times daily. for 5 days  Dispense: 10 tablet; Refill: 0  -     XR CHEST PA AND LATERAL; Future; Expected date: 05/29/2023    Cough, unspecified type  -     SARS Coronavirus 2 Antigen, POCT Manual Read  -     XR CHEST PA AND LATERAL; Future; Expected date: 05/29/2023    Other orders  -     doxycycline (VIBRAMYCIN) 100 MG Cap; Take 1 capsule (100 mg total) by mouth 2 (two) times daily. for 7 days  Dispense: 14 capsule; Refill: 0  -     hydrocodone-homatropine 5-1.5 mg/5 ml (HYCODAN) 5-1.5 mg/5 mL Syrp; Take 5 mLs by mouth every 4 (four) hours as needed.  Dispense: 120 mL; Refill: 0  -     guaiFENesin 1,200 mg Ta12; Take 1 tablet by mouth 2 (two) times daily. for 10 days  Dispense: 20 tablet; Refill: 1      Pt or guardian provided educational materials and instructions  regarding their visit diagnosis.

## 2024-12-07 ENCOUNTER — OFFICE VISIT (OUTPATIENT)
Dept: URGENT CARE | Facility: CLINIC | Age: 89
End: 2024-12-07
Payer: MEDICARE

## 2024-12-07 ENCOUNTER — TELEPHONE (OUTPATIENT)
Dept: URGENT CARE | Facility: CLINIC | Age: 89
End: 2024-12-07
Payer: MEDICARE

## 2024-12-07 VITALS
BODY MASS INDEX: 23.54 KG/M2 | WEIGHT: 150 LBS | RESPIRATION RATE: 16 BRPM | OXYGEN SATURATION: 98 % | HEART RATE: 89 BPM | SYSTOLIC BLOOD PRESSURE: 156 MMHG | TEMPERATURE: 98 F | DIASTOLIC BLOOD PRESSURE: 81 MMHG | HEIGHT: 67 IN

## 2024-12-07 DIAGNOSIS — T14.8XXA BRUISING: ICD-10-CM

## 2024-12-07 DIAGNOSIS — S29.9XXA RIB INJURY: ICD-10-CM
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
## 2024-12-07 DIAGNOSIS — R07.81 RIB PAIN ON RIGHT SIDE: ICD-10-CM

## 2024-12-07 PROBLEM — J06.9 VIRAL UPPER RESPIRATORY TRACT INFECTION: Status: ACTIVE | Noted: 2024-06-20

## 2024-12-07 PROBLEM — Z80.0 FAMILY HISTORY OF RECTAL CANCER: Status: ACTIVE | Noted: 2024-05-07

## 2024-12-07 PROBLEM — J40 BRONCHITIS: Status: ACTIVE | Noted: 2024-09-25

## 2024-12-07 PROCEDURE — 99203 OFFICE O/P NEW LOW 30 MIN: CPT | Mod: S$GLB,,, | Performed by: NURSE PRACTITIONER

## 2024-12-07 PROCEDURE — 71100 X-RAY EXAM RIBS UNI 2 VIEWS: CPT | Mod: RT,S$GLB,, | Performed by: RADIOLOGY

## 2024-12-07 RX ORDER — LANOLIN ALCOHOL/MO/W.PET/CERES
1 CREAM (GRAM) TOPICAL DAILY
COMMUNITY
Start: 2024-11-19 | End: 2024-12-07

## 2024-12-07 RX ORDER — PREDNISONE 20 MG/1
20 TABLET ORAL
COMMUNITY
Start: 2024-06-20

## 2024-12-07 RX ORDER — METOPROLOL SUCCINATE 25 MG/1
1 TABLET, EXTENDED RELEASE ORAL DAILY
COMMUNITY

## 2024-12-07 RX ORDER — PHENAZOPYRIDINE HYDROCHLORIDE 100 MG/1
100 TABLET, FILM COATED ORAL
COMMUNITY
Start: 2024-12-03

## 2024-12-07 RX ORDER — FERROUS SULFATE 325(65) MG
325 TABLET, DELAYED RELEASE (ENTERIC COATED) ORAL
COMMUNITY
Start: 2024-05-21 | End: 2024-12-07

## 2024-12-07 RX ORDER — SODIUM, POTASSIUM,MAG SULFATES 17.5-3.13G
SOLUTION, RECONSTITUTED, ORAL ORAL
COMMUNITY
Start: 2024-05-09

## 2024-12-07 RX ORDER — CICLOPIROX 80 MG/ML
SOLUTION TOPICAL
COMMUNITY
Start: 2024-07-09 | End: 2024-12-07

## 2024-12-07 RX ORDER — NITROFURANTOIN 25; 75 MG/1; MG/1
100 CAPSULE ORAL
COMMUNITY
Start: 2024-12-03 | End: 2024-12-08

## 2024-12-07 RX ORDER — SOLIFENACIN SUCCINATE 10 MG/1
1 TABLET, FILM COATED ORAL DAILY
COMMUNITY
Start: 2024-10-10 | End: 2025-10-10

## 2024-12-07 NOTE — PATIENT INSTRUCTIONS
Take 10 to 15 slow deep breaths at least 4 times each day. This will lower your chances of getting a lung infection. Use your incentive spirometer as you were told if you were given one. An incentive spirometer is a tool that measures how deeply you can breathe in.  Hold a pillow to your chest when you take deep breaths, sneeze, cough, or laugh. This will help ease the pain in your ribs.  It may hurt less to sleep in a reclined position. You can also sleep with your head and shoulders propped up on pillows.  You may want to take medicines like ibuprofen or naproxen for swelling and pain. These are nonsteroidal anti-inflammatory drugs (NSAIDS).  Ice may help you ease pain and swelling.  Place an ice pack or a bag of frozen vegetables wrapped in a towel over the painful part. Never put ice right on the skin. Do not leave the ice on more than 10 to 15 minutes at a time. Use for the first 24 to 48 hours after an injury.  Please return here or go to the Emergency Department for any concerns or worsening of condition.  Please follow up with your primary care doctor or specialist as needed.

## 2024-12-07 NOTE — PROGRESS NOTES
"Subjective:      Patient ID: Lakshmi Juarez is a 89 y.o. female.    Vitals:  height is 5' 7" (1.702 m) and weight is 68 kg (150 lb). Her oral temperature is 98 °F (36.7 °C). Her blood pressure is 156/81 (abnormal) and her pulse is 89. Her respiration is 16 and oxygen saturation is 98%.     Chief Complaint: Fall    Patient presents with c.o fall that occurred 5 days pta. Patient states she is having work done on her house and she tripped over some planks. She fell on the right side of her body causing pain to the ribs. No head trauma. Patient states it hurts to breath.         89-year-old female presents to clinic with reports of fall 5 days ago, injuring her right side resulting in pain, History of bone and cartilage disorder.            Fall  Incident onset: 5 days pta. Fall occurred: walking. She fell from a height of 3 to 5 ft. There was no blood loss. Point of impact: rt ribs. The symptoms are aggravated by pressure on injury. She has tried nothing for the symptoms.     Respiratory:  Negative for cough, shortness of breath, wheezing and asthma.    Musculoskeletal:  Positive for pain and trauma.   Skin:  Negative for erythema.   Allergic/Immunologic: Negative for asthma.      Objective:     Physical Exam   Constitutional: She is oriented to person, place, and time. She appears well-developed.   HENT:   Head: Normocephalic and atraumatic. Head is without abrasion, without contusion and without laceration.   Ears:   Right Ear: External ear normal.   Left Ear: External ear normal.   Nose: Nose normal.   Mouth/Throat: Oropharynx is clear and moist and mucous membranes are normal.   Eyes: EOM and lids are normal. Extraocular movement intact   Neck: Trachea normal and phonation normal. Neck supple.   Cardiovascular: Normal rate, regular rhythm and normal heart sounds.   Pulmonary/Chest: Effort normal and breath sounds normal. No stridor. No respiratory distress. She exhibits tenderness. She exhibits no crepitus, no " edema and no swelling.   Bruising right lateral chest wall noted             Comments: Bruising right lateral chest wall noted    Musculoskeletal: Normal range of motion.         General: Normal range of motion.        Arms:    Neurological: She is alert and oriented to person, place, and time.   Skin: Skin is warm, dry, intact and no rash. Capillary refill takes less than 2 seconds. No abrasion, No burn, No bruising, No erythema and No ecchymosis   Psychiatric: Her speech is normal and behavior is normal. Judgment and thought content normal.   Nursing note and vitals reviewed.      Assessment:     1. Fall, initial encounter    2. Rib injury    3. Rib pain on right side    4. Bruising        Plan:       Fall, initial encounter    Rib injury  -     X-Ray Ribs 2 View Right; Future; Expected date: 12/07/2024  -     BANDAGE ELASTIC 6IN ACE    Rib pain on right side  -     X-Ray Ribs 2 View Right; Future; Expected date: 12/07/2024  -     BANDAGE ELASTIC 6IN ACE    Bruising  -     BANDAGE ELASTIC 6IN ACE        X-Ray Ribs 2 View Right    Result Date: 12/7/2024  EXAMINATION: XR RIBS 2 VIEW RIGHT CLINICAL HISTORY: Unspecified injury of thorax, initial encounter TECHNIQUE: Two views of the right ribs were performed. COMPARISON: 12/30/2022 FINDINGS: No right rib fracture or other rib abnormality.  The right lung is clear.  No pneumothorax.  No right effusion.     No right rib fracture. Electronically signed by: Leatha Foster Date:    12/07/2024 Time:    12:17      Reviewed xray with patient who acknowledged results. Discussed  Diagnosis and treatment plan with patient who verbalized understanding and agrees with plan of care.  Patient given educational handouts supporting this diagnosis as well.  Ace wrap applied to chest wall, patient tolerated well.  Patient denies any further questions or concerns at this time.  Patient exits exam room in no acute distress.     Patient Instructions   Take 10 to 15 slow deep breaths at  least 4 times each day. This will lower your chances of getting a lung infection. Use your incentive spirometer as you were told if you were given one. An incentive spirometer is a tool that measures how deeply you can breathe in.  Hold a pillow to your chest when you take deep breaths, sneeze, cough, or laugh. This will help ease the pain in your ribs.  It may hurt less to sleep in a reclined position. You can also sleep with your head and shoulders propped up on pillows.  You may want to take medicines like ibuprofen or naproxen for swelling and pain. These are nonsteroidal anti-inflammatory drugs (NSAIDS).  Ice may help you ease pain and swelling.  Place an ice pack or a bag of frozen vegetables wrapped in a towel over the painful part. Never put ice right on the skin. Do not leave the ice on more than 10 to 15 minutes at a time. Use for the first 24 to 48 hours after an injury.  Please return here or go to the Emergency Department for any concerns or worsening of condition.  Please follow up with your primary care doctor or specialist as needed.

## 2024-12-07 NOTE — TELEPHONE ENCOUNTER
patient called, notified of xray result     X-Ray Ribs 2 View Right    Result Date: 12/7/2024  EXAMINATION: XR RIBS 2 VIEW RIGHT CLINICAL HISTORY: Unspecified injury of thorax, initial encounter TECHNIQUE: Two views of the right ribs were performed. COMPARISON: 12/30/2022 FINDINGS: No right rib fracture or other rib abnormality.  The right lung is clear.  No pneumothorax.  No right effusion.     No right rib fracture. Electronically signed by: Leatha Foster Date:    12/07/2024 Time:    12:17